# Patient Record
Sex: FEMALE | Race: WHITE | NOT HISPANIC OR LATINO | Employment: OTHER | URBAN - METROPOLITAN AREA
[De-identification: names, ages, dates, MRNs, and addresses within clinical notes are randomized per-mention and may not be internally consistent; named-entity substitution may affect disease eponyms.]

---

## 2023-07-19 ENCOUNTER — OFFICE VISIT (OUTPATIENT)
Dept: PRIMARY CARE | Facility: CLINIC | Age: 78
End: 2023-07-19
Payer: MEDICARE

## 2023-07-19 ENCOUNTER — LAB (OUTPATIENT)
Dept: LAB | Facility: LAB | Age: 78
End: 2023-07-19
Payer: MEDICARE

## 2023-07-19 VITALS — SYSTOLIC BLOOD PRESSURE: 132 MMHG | BODY MASS INDEX: 28.63 KG/M2 | WEIGHT: 151.5 LBS | DIASTOLIC BLOOD PRESSURE: 75 MMHG

## 2023-07-19 DIAGNOSIS — I10 PRIMARY HYPERTENSION: ICD-10-CM

## 2023-07-19 DIAGNOSIS — M48.061 SPINAL STENOSIS OF LUMBAR REGION, UNSPECIFIED WHETHER NEUROGENIC CLAUDICATION PRESENT: ICD-10-CM

## 2023-07-19 DIAGNOSIS — E11.9 TYPE 2 DIABETES MELLITUS WITHOUT COMPLICATION, UNSPECIFIED WHETHER LONG TERM INSULIN USE (MULTI): ICD-10-CM

## 2023-07-19 DIAGNOSIS — E78.2 MIXED HYPERLIPIDEMIA: ICD-10-CM

## 2023-07-19 DIAGNOSIS — Z00.00 HEALTH CARE MAINTENANCE: ICD-10-CM

## 2023-07-19 DIAGNOSIS — E11.9 TYPE 2 DIABETES MELLITUS WITHOUT COMPLICATION, UNSPECIFIED WHETHER LONG TERM INSULIN USE (MULTI): Primary | ICD-10-CM

## 2023-07-19 LAB
ALANINE AMINOTRANSFERASE (SGPT) (U/L) IN SER/PLAS: 39 U/L (ref 7–45)
ALBUMIN (G/DL) IN SER/PLAS: 4.7 G/DL (ref 3.4–5)
ALKALINE PHOSPHATASE (U/L) IN SER/PLAS: 101 U/L (ref 33–136)
ANION GAP IN SER/PLAS: 14 MMOL/L (ref 10–20)
ASPARTATE AMINOTRANSFERASE (SGOT) (U/L) IN SER/PLAS: 31 U/L (ref 9–39)
BILIRUBIN TOTAL (MG/DL) IN SER/PLAS: 0.3 MG/DL (ref 0–1.2)
CALCIUM (MG/DL) IN SER/PLAS: 10.1 MG/DL (ref 8.6–10.6)
CARBON DIOXIDE, TOTAL (MMOL/L) IN SER/PLAS: 29 MMOL/L (ref 21–32)
CHLORIDE (MMOL/L) IN SER/PLAS: 103 MMOL/L (ref 98–107)
CHOLESTEROL (MG/DL) IN SER/PLAS: 165 MG/DL (ref 0–199)
CHOLESTEROL IN HDL (MG/DL) IN SER/PLAS: 40.8 MG/DL
CHOLESTEROL/HDL RATIO: 4
CREATININE (MG/DL) IN SER/PLAS: 1.04 MG/DL (ref 0.5–1.05)
ERYTHROCYTE DISTRIBUTION WIDTH (RATIO) BY AUTOMATED COUNT: 13.8 % (ref 11.5–14.5)
ERYTHROCYTE MEAN CORPUSCULAR HEMOGLOBIN CONCENTRATION (G/DL) BY AUTOMATED: 32.5 G/DL (ref 32–36)
ERYTHROCYTE MEAN CORPUSCULAR VOLUME (FL) BY AUTOMATED COUNT: 92 FL (ref 80–100)
ERYTHROCYTES (10*6/UL) IN BLOOD BY AUTOMATED COUNT: 4.52 X10E12/L (ref 4–5.2)
ESTIMATED AVERAGE GLUCOSE FOR HBA1C: 146 MG/DL
GFR FEMALE: 56 ML/MIN/1.73M2
GLUCOSE (MG/DL) IN SER/PLAS: 101 MG/DL (ref 74–99)
HEMATOCRIT (%) IN BLOOD BY AUTOMATED COUNT: 41.8 % (ref 36–46)
HEMOGLOBIN (G/DL) IN BLOOD: 13.6 G/DL (ref 12–16)
HEMOGLOBIN A1C/HEMOGLOBIN TOTAL IN BLOOD: 6.7 %
LDL: 76 MG/DL (ref 0–99)
LEUKOCYTES (10*3/UL) IN BLOOD BY AUTOMATED COUNT: 5.4 X10E9/L (ref 4.4–11.3)
NON HDL CHOLESTEROL: 124 MG/DL
NRBC (PER 100 WBCS) BY AUTOMATED COUNT: 0 /100 WBC (ref 0–0)
PLATELETS (10*3/UL) IN BLOOD AUTOMATED COUNT: 313 X10E9/L (ref 150–450)
POTASSIUM (MMOL/L) IN SER/PLAS: 5 MMOL/L (ref 3.5–5.3)
PROTEIN TOTAL: 7.1 G/DL (ref 6.4–8.2)
SODIUM (MMOL/L) IN SER/PLAS: 141 MMOL/L (ref 136–145)
TRIGLYCERIDE (MG/DL) IN SER/PLAS: 240 MG/DL (ref 0–149)
UREA NITROGEN (MG/DL) IN SER/PLAS: 13 MG/DL (ref 6–23)
VLDL: 48 MG/DL (ref 0–40)

## 2023-07-19 PROCEDURE — 3075F SYST BP GE 130 - 139MM HG: CPT | Performed by: INTERNAL MEDICINE

## 2023-07-19 PROCEDURE — 85027 COMPLETE CBC AUTOMATED: CPT

## 2023-07-19 PROCEDURE — 80061 LIPID PANEL: CPT

## 2023-07-19 PROCEDURE — 1126F AMNT PAIN NOTED NONE PRSNT: CPT | Performed by: INTERNAL MEDICINE

## 2023-07-19 PROCEDURE — G0439 PPPS, SUBSEQ VISIT: HCPCS | Performed by: INTERNAL MEDICINE

## 2023-07-19 PROCEDURE — 1036F TOBACCO NON-USER: CPT | Performed by: INTERNAL MEDICINE

## 2023-07-19 PROCEDURE — 99214 OFFICE O/P EST MOD 30 MIN: CPT | Performed by: INTERNAL MEDICINE

## 2023-07-19 PROCEDURE — 93000 ELECTROCARDIOGRAM COMPLETE: CPT | Performed by: INTERNAL MEDICINE

## 2023-07-19 PROCEDURE — 1170F FXNL STATUS ASSESSED: CPT | Performed by: INTERNAL MEDICINE

## 2023-07-19 PROCEDURE — 1160F RVW MEDS BY RX/DR IN RCRD: CPT | Performed by: INTERNAL MEDICINE

## 2023-07-19 PROCEDURE — 36415 COLL VENOUS BLD VENIPUNCTURE: CPT

## 2023-07-19 PROCEDURE — 3044F HG A1C LEVEL LT 7.0%: CPT | Performed by: INTERNAL MEDICINE

## 2023-07-19 PROCEDURE — 80053 COMPREHEN METABOLIC PANEL: CPT

## 2023-07-19 PROCEDURE — 3078F DIAST BP <80 MM HG: CPT | Performed by: INTERNAL MEDICINE

## 2023-07-19 PROCEDURE — 1159F MED LIST DOCD IN RCRD: CPT | Performed by: INTERNAL MEDICINE

## 2023-07-19 PROCEDURE — 83036 HEMOGLOBIN GLYCOSYLATED A1C: CPT

## 2023-07-19 RX ORDER — GABAPENTIN 300 MG/1
300 CAPSULE ORAL 2 TIMES DAILY
COMMUNITY
End: 2023-08-04 | Stop reason: SDUPTHER

## 2023-07-19 RX ORDER — GABAPENTIN 300 MG/1
300 CAPSULE ORAL 2 TIMES DAILY
Qty: 180 CAPSULE | Refills: 0 | Status: CANCELLED | OUTPATIENT
Start: 2023-07-19

## 2023-07-19 NOTE — PROGRESS NOTES
Subjective   Patient ID: Alida Drew is a 77 y.o. female who presents for No chief complaint on file..    HPI CPE see updated front sheet no chest pain no shortness of breath no polyuria polydipsia risk-benefit side effect of gabapentin medicine reviewed with her and wishes to proceed joints have been aching right sacroiliac area right hand not inconsistent with carpal tunnel distribution her cervical spine has been okay her lumbar spine is actually been okay the gabapentin has been helpful especially at night for legs and back she also takes an Advil PM bowels okay no dysuria no polyuria polydipsia    Past medical history spinal stenosis    Medications noted and unchanged    Allergies no known drug allergies    Social history no tobacco    Family history Brother colon cancer    Prevention had colonoscopy in the past with Dr. Balbuena had hand surgery in the past with Dr. Flores some exercise some prior blood work reviewed    Depression screen not depressed    Review of Systems    Objective   There were no vitals taken for this visit.    Physical Exam vital signs noted alert and oriented x3 NCAT PERRLA EOMI nares without discharge OP benign TM normal bilateral EAC clear bilateral no AC nodes no JVD or bruit no thyromegaly chest clear to auscultation and percussion CV regular rate and rhythm S1-S2 without murmur gallop or rub abdomen soft nontender normal active bowel sounds no rebound or guarding no HSM LS spine normal curvature negative straight leg raise negative logroll negative SI joint tenderness cervical spine limited range of motion normal except for extremity handgrips some tingling in the distribution of the right carpal tunnel extremities no clubbing cyanosis or edema normal distal pulses DTR 2+ upper extremity 0-1+ lower extremity             impression General medical examination spinal stenosis diabetes mellitus?  Hypertension?  Hyperlipidemia?  Right carpal tunnel right sacroiliac  cough    Assessment/Plan    plan has seen ophthalmologist check EKG advised on heart consider ARB or other renal medicine currently  She has had some slight cough for the past 4 weeks lungs are clear we will use lozenges for presumed vocal cord strain or tracheitis does not apparently have much in the way of GERD type symptoms but may change her Advil PM to Tylenol PM and avoid NSAIDs for the stomach and kidneys check glycohemoglobin advised on long-term blood sugar test check Chem-7 advised on glucose potassium and kidney function advised on blood pressure blood pressure medicine check hepatic panel lipid panel advised on cholesterol cholesterol medicine check CBC advised on blood count follow-up on next colonoscopy she will go to the orthopedic hand surgeon at her convenience for the carpal tunnel follow-up for next mammogram [place] Tylenol or topical agents as needed for the sacroiliac back hygiene back stretches continue with the gabapentin as needed (see below) and follow-up 3 months based on above TT 60 cc 31     Review medications (has not filled gabapentin lately) check

## 2023-07-30 ASSESSMENT — ENCOUNTER SYMPTOMS
LOSS OF SENSATION IN FEET: 0
OCCASIONAL FEELINGS OF UNSTEADINESS: 0
DEPRESSION: 0

## 2023-07-30 ASSESSMENT — PATIENT HEALTH QUESTIONNAIRE - PHQ9
1. LITTLE INTEREST OR PLEASURE IN DOING THINGS: NOT AT ALL
SUM OF ALL RESPONSES TO PHQ9 QUESTIONS 1 AND 2: 0
2. FEELING DOWN, DEPRESSED OR HOPELESS: NOT AT ALL

## 2023-07-30 ASSESSMENT — ACTIVITIES OF DAILY LIVING (ADL)
BATHING: INDEPENDENT
MANAGING_FINANCES: INDEPENDENT
GROCERY_SHOPPING: INDEPENDENT
DRESSING: INDEPENDENT
TAKING_MEDICATION: INDEPENDENT
DOING_HOUSEWORK: INDEPENDENT

## 2023-08-04 DIAGNOSIS — M48.061 SPINAL STENOSIS OF LUMBAR REGION, UNSPECIFIED WHETHER NEUROGENIC CLAUDICATION PRESENT: Primary | ICD-10-CM

## 2023-08-04 RX ORDER — GABAPENTIN 300 MG/1
300 CAPSULE ORAL 2 TIMES DAILY
Qty: 60 CAPSULE | Refills: 0 | Status: SHIPPED | OUTPATIENT
Start: 2023-08-04 | End: 2023-10-07 | Stop reason: SDUPTHER

## 2023-09-25 PROBLEM — M79.643 HAND PAIN: Status: ACTIVE | Noted: 2023-09-25

## 2023-09-25 PROBLEM — R53.83 FATIGUE: Status: ACTIVE | Noted: 2023-09-25

## 2023-09-25 PROBLEM — J20.8 ACUTE BACTERIAL BRONCHITIS: Status: ACTIVE | Noted: 2023-09-25

## 2023-09-25 PROBLEM — E11.9 DIABETES (MULTI): Status: ACTIVE | Noted: 2023-09-25

## 2023-09-25 PROBLEM — B96.89 ACUTE BACTERIAL BRONCHITIS: Status: ACTIVE | Noted: 2023-09-25

## 2023-09-25 PROBLEM — K21.9 GERD (GASTROESOPHAGEAL REFLUX DISEASE): Status: ACTIVE | Noted: 2023-09-25

## 2023-09-25 PROBLEM — L30.9 DERMATITIS: Status: ACTIVE | Noted: 2023-09-25

## 2023-09-25 PROBLEM — M65.30 TRIGGER FINGER: Status: ACTIVE | Noted: 2023-09-25

## 2023-09-25 PROBLEM — E78.5 HYPERLIPIDEMIA: Status: ACTIVE | Noted: 2023-09-25

## 2023-09-25 PROBLEM — F32.A DEPRESSION: Status: ACTIVE | Noted: 2023-09-25

## 2023-09-25 PROBLEM — R13.10 DYSPHAGIA: Status: ACTIVE | Noted: 2023-09-25

## 2023-09-25 PROBLEM — J32.0 MAXILLARY SINUSITIS: Status: ACTIVE | Noted: 2023-09-25

## 2023-09-25 PROBLEM — G47.00 ACUTE INSOMNIA: Status: ACTIVE | Noted: 2023-09-25

## 2023-09-25 RX ORDER — ELECTROLYTES/DEXTROSE
SOLUTION, ORAL ORAL
COMMUNITY

## 2023-09-25 RX ORDER — OMEGA-3 FATTY ACIDS/FISH OIL 340-1000MG
CAPSULE ORAL 2 TIMES DAILY
COMMUNITY

## 2023-09-25 RX ORDER — MULTIVIT-MIN/IRON/FOLIC ACID/K 18-600-40
CAPSULE ORAL
COMMUNITY

## 2023-09-25 RX ORDER — FLUOCINONIDE CREAM (EMULSIFIED BASE) 0.5 MG/G
CREAM TOPICAL
Status: ON HOLD | COMMUNITY
End: 2023-11-27 | Stop reason: ALTCHOICE

## 2023-09-25 RX ORDER — FLUOCINONIDE 0.5 MG/G
CREAM TOPICAL 2 TIMES DAILY
COMMUNITY
Start: 2023-03-27

## 2023-09-25 RX ORDER — MELATON/THEAN/VAL/LEM/CHAM/LAV 10MG-200MG
1 TABLET,IMMED, EXTENDED RELEASE, BIPHASIC ORAL DAILY
COMMUNITY

## 2023-09-25 RX ORDER — TRAMADOL HYDROCHLORIDE 50 MG/1
TABLET ORAL EVERY 6 HOURS PRN
Status: ON HOLD | COMMUNITY
Start: 2022-04-25 | End: 2023-11-27 | Stop reason: ALTCHOICE

## 2023-09-25 RX ORDER — AMOXICILLIN AND CLAVULANATE POTASSIUM 875; 125 MG/1; MG/1
1 TABLET, FILM COATED ORAL EVERY 12 HOURS
Status: ON HOLD | COMMUNITY
Start: 2023-02-28 | End: 2023-11-27 | Stop reason: ALTCHOICE

## 2023-09-25 RX ORDER — COVID-19 ANTIGEN TEST
KIT MISCELLANEOUS
Status: ON HOLD | COMMUNITY
Start: 2023-03-29 | End: 2023-11-27 | Stop reason: ALTCHOICE

## 2023-09-25 RX ORDER — FLAXSEED OIL 1000 MG
CAPSULE ORAL
COMMUNITY

## 2023-09-25 RX ORDER — GLIMEPIRIDE 2 MG/1
TABLET ORAL
COMMUNITY
End: 2024-01-23

## 2023-09-25 RX ORDER — PHENYLEPHRINE HCL 10 MG
TABLET ORAL 2 TIMES DAILY
COMMUNITY

## 2023-09-25 RX ORDER — GLUCOSAMINE/CHONDR SU A SOD 750-600 MG
TABLET ORAL 2 TIMES DAILY
COMMUNITY

## 2023-09-25 RX ORDER — NAPROXEN SODIUM 220 MG
TABLET ORAL EVERY 12 HOURS PRN
Status: ON HOLD | COMMUNITY
End: 2023-11-27 | Stop reason: ALTCHOICE

## 2023-09-25 RX ORDER — OXYCODONE AND ACETAMINOPHEN 5; 325 MG/1; MG/1
TABLET ORAL
Status: ON HOLD | COMMUNITY
Start: 2022-04-25 | End: 2023-11-27 | Stop reason: ALTCHOICE

## 2023-09-25 RX ORDER — AMOXICILLIN 500 MG/1
CAPSULE ORAL
Status: ON HOLD | COMMUNITY
Start: 2023-02-20 | End: 2023-11-27 | Stop reason: ALTCHOICE

## 2023-09-25 RX ORDER — ROSUVASTATIN CALCIUM 10 MG/1
TABLET, COATED ORAL
COMMUNITY
End: 2023-10-24

## 2023-09-25 RX ORDER — CHOLECALCIFEROL (VITAMIN D3) 25 MCG
1 TABLET ORAL DAILY
COMMUNITY

## 2023-09-25 RX ORDER — RABEPRAZOLE SODIUM 20 MG/1
20 TABLET, DELAYED RELEASE ORAL DAILY
COMMUNITY
End: 2023-10-24

## 2023-09-25 RX ORDER — ESCITALOPRAM OXALATE 10 MG/1
1 TABLET ORAL DAILY
COMMUNITY
End: 2024-01-23

## 2023-10-03 ENCOUNTER — PROCEDURE VISIT (OUTPATIENT)
Dept: NEUROLOGY | Facility: CLINIC | Age: 78
End: 2023-10-03
Payer: MEDICARE

## 2023-10-03 DIAGNOSIS — G56.01 CARPAL TUNNEL SYNDROME OF RIGHT WRIST: Primary | ICD-10-CM

## 2023-10-03 DIAGNOSIS — M48.061 SPINAL STENOSIS OF LUMBAR REGION, UNSPECIFIED WHETHER NEUROGENIC CLAUDICATION PRESENT: ICD-10-CM

## 2023-10-03 PROCEDURE — 76883 US NRV&ACC STRUX 1XTR COMPRE: CPT | Performed by: STUDENT IN AN ORGANIZED HEALTH CARE EDUCATION/TRAINING PROGRAM

## 2023-10-03 NOTE — PROGRESS NOTES
Performed by: Roshan Velasco DO  Authorized by: Roshan Velasco DO      Comments: NEUROMUSCULAR ULTRASOUND OF THE RIGHT [WRIST AND FOREARM]    HEIGHT: 5ft 1 in  WEIGHT: 150 lbs.  HANDEDNESS: RIGHT    INDICATION FOR NEUROMUSCULAR ULTRASOUND:    (a) to evaluate the echotexture and size of the right median nerve at the carpal tunnel; (b) confirm the right median nerve was normal in the forearm; (c) to assess for any identifiable structural source of right median nerve compression; and (d) to assess adjacent structures around the right wrist for abnormalities.    TECHNIQUE:    Neuromuscular ultrasound of the right wrist and forearm was performed using a tsumobi P9 ultrasound machine with a 15-4 MHz linear transducer. The right median nerve was examined at the distal wrist crease and in the mid-forearm, both in transverse and longitudinal views. The patient was examined supine with the arm extended and supinated. Cross sectional area (CSA) was measured within the epineurium, using the trace method. At locations where repeat measurements were taken, the mean value is reported below. Transverse and longitudinal images were obtained.    FINDINGS:  At the right wrist at the distal wrist crease (proximal carpal tunnel), the median nerve CSA was 17.6 mm2 (NL < 10 mm2; borderline 10-13 mm2; ABN > 13 mm2).     The flattening ratio at this location was 5.5 (ABN >3).    The echogenicity of the right median nerve was moderately reduced. The mobility of the right median nerve with flexion and extension of the fingers was slightly restricted. Color Doppler of the right median nerve showed normal median nerve vascularity. No abnormal tenosynovitis of the right flexor tendons was noted.    Using a longitudinal scan of the right median nerve at the wrist, a notch sign was seen under the flexor retinaculum.    A right persistent median artery was not present. A right bifid median nerve was not  present. No other abnormal  mass or ganglia was appreciated in the right carpal tunnel. With extension of the fingers, there was mild intrusion of the right flexor digitorum sublimis. With flexion of the fingers, there was no abnormal intrusion of the right lumbrical muscles.    In the mid-forearm, approximately 12 cm proximal to the distal wrist crease, the right median nerve was seen between the flexor digitorum sublimis and flexor digitorum profundus muscles. At the mid-forearm, the right median nerve CSA was 5.9 mm2.  The ratio of the right median CSAs between the wrist and forearm (WFR) was 3.0 (NL < 1.5; borderline: 1.5 - 2.0).  The echogenicity of the right median nerve in the forearm was normal.    Scanning the muscles, the echogenicity of the abductor pollicis brevis and flexor pollicis brevis (superficial) were slightly increased along with mildly decreased muscle thickness. The echogenicity was normal of the abductor pollicis brevis, flexor digitorum sublimis, flexor digitorum profundus, flexor carpi radialis, and pronator teres.    At the wrist joint, the radial carpal joint was normal. No abnormal effusion was seen. The flexor carpi radialis tendon was revealed a mild amount of paratendinous fluid without increased signal on color Doppler. Both the radial and ulnar arteries were well seen and were normal.    IMPRESSION:   This is an abnormal neuromuscular ultrasound examination of the right wrist and volar forearm.  There was ultrasound evidence of a moderate median neuropathy at the right wrist. In addition, there was a mild degree of tendinosis in the distal right flexor carpi radialis insertion. The other visualized osseous, ligamentous and tendinous structures appear normal.         Roshan Velasco DO

## 2023-10-07 RX ORDER — GABAPENTIN 300 MG/1
300 CAPSULE ORAL 2 TIMES DAILY
Qty: 60 CAPSULE | Refills: 0 | Status: SHIPPED | OUTPATIENT
Start: 2023-10-07 | End: 2024-01-26

## 2023-10-07 RX ORDER — GABAPENTIN 300 MG/1
300 CAPSULE ORAL 3 TIMES DAILY
Qty: 90 CAPSULE | Refills: 0 | OUTPATIENT
Start: 2023-10-07

## 2023-10-09 ENCOUNTER — TELEPHONE (OUTPATIENT)
Dept: NEUROLOGY | Facility: HOSPITAL | Age: 78
End: 2023-10-09

## 2023-10-09 ENCOUNTER — OFFICE VISIT (OUTPATIENT)
Dept: PRIMARY CARE | Facility: CLINIC | Age: 78
End: 2023-10-09
Payer: MEDICARE

## 2023-10-09 VITALS — SYSTOLIC BLOOD PRESSURE: 126 MMHG | DIASTOLIC BLOOD PRESSURE: 76 MMHG

## 2023-10-09 DIAGNOSIS — I10 PRIMARY HYPERTENSION: ICD-10-CM

## 2023-10-09 DIAGNOSIS — E11.9 TYPE 2 DIABETES MELLITUS WITHOUT COMPLICATION, UNSPECIFIED WHETHER LONG TERM INSULIN USE (MULTI): Primary | ICD-10-CM

## 2023-10-09 DIAGNOSIS — M48.061 SPINAL STENOSIS OF LUMBAR REGION, UNSPECIFIED WHETHER NEUROGENIC CLAUDICATION PRESENT: ICD-10-CM

## 2023-10-09 PROCEDURE — 1160F RVW MEDS BY RX/DR IN RCRD: CPT | Performed by: INTERNAL MEDICINE

## 2023-10-09 PROCEDURE — 1159F MED LIST DOCD IN RCRD: CPT | Performed by: INTERNAL MEDICINE

## 2023-10-09 PROCEDURE — 99213 OFFICE O/P EST LOW 20 MIN: CPT | Performed by: INTERNAL MEDICINE

## 2023-10-09 PROCEDURE — 3074F SYST BP LT 130 MM HG: CPT | Performed by: INTERNAL MEDICINE

## 2023-10-09 PROCEDURE — 1036F TOBACCO NON-USER: CPT | Performed by: INTERNAL MEDICINE

## 2023-10-09 PROCEDURE — 1126F AMNT PAIN NOTED NONE PRSNT: CPT | Performed by: INTERNAL MEDICINE

## 2023-10-09 PROCEDURE — 3078F DIAST BP <80 MM HG: CPT | Performed by: INTERNAL MEDICINE

## 2023-10-09 NOTE — TELEPHONE ENCOUNTER
Pt called and stated that she is aware of the difficulty with the new system.  Pt trying to schedule an appt with Dr. Thomas but unable to read the US results.

## 2023-10-09 NOTE — PROGRESS NOTES
Subjective   Patient ID: Alida Drew is a 78 y.o. female who presents for No chief complaint on file..    HPI follow-up visit no chest pain no shortness of breath had neuromuscular ultrasound and will have her carpal tunnel syndrome surgery repeated upcoming she has appointment with orthopedic hand surgeon no polyuria polydipsia no hypoglycemic events gabapentin has been helpful for her low back occasionally she will take a second capsule and this has been helpful no side effect with medication prior laboratory results reviewed    Review of Systems    Objective   There were no vitals taken for this visit.    Physical Exam    Assessment/Plan vital signs noted alert and oriented x3 NCAT no JVD or bruit chest clear to auscultation and percussion CV regular rate and rhythm S1-S2 without murmur gallop or rub extremities no clubbing cyanosis or edema normal distal pulses no coryza nares without discharge OP benign musculoskeletal bilateral hand DJD changes    Impression hypertension?  Hyperlipidemia?  Diabetes mellitus?  DJD hands and low back pain  Plan refill gabapentin when needed just refilled recently good diet regular exercise increase water consumption follow-up with orthopedic surgery no additional blood work was needed yet today but may repeat at next visit in 6 months continue with current treatment Tylenol as needed liniment cream for the hands and follow-up 2024 spring

## 2023-10-24 DIAGNOSIS — R13.10 DYSPHAGIA: ICD-10-CM

## 2023-10-24 RX ORDER — ROSUVASTATIN CALCIUM 10 MG/1
TABLET, COATED ORAL
Qty: 90 TABLET | Refills: 1 | Status: SHIPPED | OUTPATIENT
Start: 2023-10-24 | End: 2024-04-19

## 2023-10-24 RX ORDER — RABEPRAZOLE SODIUM 20 MG/1
20 TABLET, DELAYED RELEASE ORAL DAILY
Qty: 90 TABLET | Refills: 1 | Status: SHIPPED | OUTPATIENT
Start: 2023-10-24 | End: 2024-04-19

## 2023-11-08 ENCOUNTER — OFFICE VISIT (OUTPATIENT)
Dept: ORTHOPEDIC SURGERY | Facility: CLINIC | Age: 78
End: 2023-11-08
Payer: MEDICARE

## 2023-11-08 DIAGNOSIS — G56.01 CARPAL TUNNEL SYNDROME OF RIGHT WRIST: Primary | ICD-10-CM

## 2023-11-08 PROCEDURE — 99214 OFFICE O/P EST MOD 30 MIN: CPT | Performed by: ORTHOPAEDIC SURGERY

## 2023-11-08 PROCEDURE — 1126F AMNT PAIN NOTED NONE PRSNT: CPT | Performed by: ORTHOPAEDIC SURGERY

## 2023-11-08 PROCEDURE — 1036F TOBACCO NON-USER: CPT | Performed by: ORTHOPAEDIC SURGERY

## 2023-11-08 PROCEDURE — 1159F MED LIST DOCD IN RCRD: CPT | Performed by: ORTHOPAEDIC SURGERY

## 2023-11-08 PROCEDURE — 1160F RVW MEDS BY RX/DR IN RCRD: CPT | Performed by: ORTHOPAEDIC SURGERY

## 2023-11-08 NOTE — LETTER
November 10, 2023     Frederick Atwood MD  [address]  [facility] [address]  [facility] [address]    Patient: Alida Drew   YOB: 1945   Date of Visit: 11/8/2023       Dear Dr. Frederick Atwood MD:    Thank you for referring Alida Drew to me for evaluation. Below are my notes for this consultation.  If you have questions, please do not hesitate to call me. I look forward to following your patient along with you.       Sincerely,     Alessandro Thomas MD      CC: No Recipients  ______________________________________________________________________________________    CHIEF COMPLAINT         Follow-up after NMUS    ASSESSMENT + PLAN    Recurrent carpal tunnel syndrome with positive NMUS after remote carpal tunnel surgery    We had a long discussion about potential causes for these recurrent symptoms.  The NMUS does show enlargement of the nerve, a positive notch sign, and limited glide, all pointing to recurrent compression at the carpal tunnel.  We discussed the major risks and benefits of revision carpal tunnel surgery and the possible need for placement of a scar barrier.  Surgery would be under sedation and local at the location of your convenience.  Contact my office to set up an exact surgical date.    Follow-up in the meantime with any interval concerns.        HISTORY OF PRESENT ILLNESS       Patient returns today, 2 months after last visit, having completed NMUS to evaluate her right carpal tunnel at the site of previous surgical decompression by Dr. Boogie Conde in 1998.  No interval trauma.  No significant change in her symptoms over the last couple of months.  She is still wearing the night splint but is having breakthrough symptoms.      PHYSICAL EXAM       Essentially unchanged.  Well-developed, well-nourished female in no acute distress.  She appears her stated age and has a pleasant affect.  Incision at the carpal tunnel is well-healed and nontender.  No sign of  infection.  Full composite finger flexion extension with good intrinsic plus minus posture.  No significant triggering.  5/5 APB and hand intrinsics.  Positive Phalen and Durkan but negative Tinel at the wrist.  Negative elbow flexion test.  Cervical range of motion remains good and does not reproduce symptoms.      IMAGING / LABS / EMGs        Neuromuscular ultrasound of the right upper extremity from October 3 was reviewed.  There is increased CSA at 17.6 with significant flattening ratio of 5.5.  Decreased echogenicity.  Slightly decreased glide.  There is a positive notch sign.  There is mild FDS intrusion.  Size ratio to the forearm is 3.0.  Conclusion is recurrent median nerve compression at the wrist (carpal tunnel syndrome,) and I agree.    POSTING    I reviewed the options for further management of this condition and the likely success rates of each.  The patient feels that they have maximized the benefits of conservative care, and they do want to go on to surgery.    I reviewed the major risks of surgery including infection; scarring; damage to nerves, tendons, or vessels; stiffness; failure to relieve symptoms; recurrent symptoms; pillar pain and wound healing problems, as well as anesthesia risks.  I answered their questions to their satisfaction.  They were given my contact information and will contact the office when they are ready to schedule an exact surgical date.  Surgery will be posted as follows :    Dx :         Recurrent right carpal tunnel syndrome  ICD-10 :    G56.01  Procedure : Revision right carpal tunnel release with possible scar barrier placement  CPT :      79781  Anesth : MAC  [facility]  Duration : 60 minutes  Specials : Oswald 10 x 48 scar barrier  PAT :       No  Post-Op Visit :   10-15 days      Electronically Signed      YNES Thomas MD      Orthopaedic Hand Surgery      [phone]

## 2023-11-10 DIAGNOSIS — G56.01 CARPAL TUNNEL SYNDROME, RIGHT: ICD-10-CM

## 2023-11-10 NOTE — H&P (VIEW-ONLY)
CHIEF COMPLAINT         Follow-up after NMUS    ASSESSMENT + PLAN    Recurrent carpal tunnel syndrome with positive NMUS after remote carpal tunnel surgery    We had a long discussion about potential causes for these recurrent symptoms.  The NMUS does show enlargement of the nerve, a positive notch sign, and limited glide, all pointing to recurrent compression at the carpal tunnel.  We discussed the major risks and benefits of revision carpal tunnel surgery and the possible need for placement of a scar barrier.  Surgery would be under sedation and local at the location of your convenience.  Contact my office to set up an exact surgical date.    Follow-up in the meantime with any interval concerns.        HISTORY OF PRESENT ILLNESS       Patient returns today, 2 months after last visit, having completed NMUS to evaluate her right carpal tunnel at the site of previous surgical decompression by Dr. Boogie Conde in 1998.  No interval trauma.  No significant change in her symptoms over the last couple of months.  She is still wearing the night splint but is having breakthrough symptoms.      PHYSICAL EXAM       Essentially unchanged.  Well-developed, well-nourished female in no acute distress.  She appears her stated age and has a pleasant affect.  Incision at the carpal tunnel is well-healed and nontender.  No sign of infection.  Full composite finger flexion extension with good intrinsic plus minus posture.  No significant triggering.  5/5 APB and hand intrinsics.  Positive Phalen and Durkan but negative Tinel at the wrist.  Negative elbow flexion test.  Cervical range of motion remains good and does not reproduce symptoms.      IMAGING / LABS / EMGs        Neuromuscular ultrasound of the right upper extremity from October 3 was reviewed.  There is increased CSA at 17.6 with significant flattening ratio of 5.5.  Decreased echogenicity.  Slightly decreased glide.  There is a positive notch sign.  There is mild FDS  intrusion.  Size ratio to the forearm is 3.0.  Conclusion is recurrent median nerve compression at the wrist (carpal tunnel syndrome,) and I agree.    POSTING    I reviewed the options for further management of this condition and the likely success rates of each.  The patient feels that they have maximized the benefits of conservative care, and they do want to go on to surgery.    I reviewed the major risks of surgery including infection; scarring; damage to nerves, tendons, or vessels; stiffness; failure to relieve symptoms; recurrent symptoms; pillar pain and wound healing problems, as well as anesthesia risks.  I answered their questions to their satisfaction.  They were given my contact information and will contact the office when they are ready to schedule an exact surgical date.  Surgery will be posted as follows :    Dx :         Recurrent right carpal tunnel syndrome  ICD-10 :    G56.01  Procedure : Revision right carpal tunnel release with possible scar barrier placement  CPT :      35177  Anesth : MAC  [facility]  Duration : 60 minutes  Specials : Oswald 10 x 48 scar barrier  PAT :       No  Post-Op Visit :   10-15 days      Electronically Signed      YNES Thomas MD      Orthopaedic Hand Surgery      [phone]

## 2023-11-10 NOTE — PROGRESS NOTES
CHIEF COMPLAINT         Follow-up after NMUS    ASSESSMENT + PLAN    Recurrent carpal tunnel syndrome with positive NMUS after remote carpal tunnel surgery    We had a long discussion about potential causes for these recurrent symptoms.  The NMUS does show enlargement of the nerve, a positive notch sign, and limited glide, all pointing to recurrent compression at the carpal tunnel.  We discussed the major risks and benefits of revision carpal tunnel surgery and the possible need for placement of a scar barrier.  Surgery would be under sedation and local at the location of your convenience.  Contact my office to set up an exact surgical date.    Follow-up in the meantime with any interval concerns.        HISTORY OF PRESENT ILLNESS       Patient returns today, 2 months after last visit, having completed NMUS to evaluate her right carpal tunnel at the site of previous surgical decompression by Dr. Boogie Conde in 1998.  No interval trauma.  No significant change in her symptoms over the last couple of months.  She is still wearing the night splint but is having breakthrough symptoms.      PHYSICAL EXAM       Essentially unchanged.  Well-developed, well-nourished female in no acute distress.  She appears her stated age and has a pleasant affect.  Incision at the carpal tunnel is well-healed and nontender.  No sign of infection.  Full composite finger flexion extension with good intrinsic plus minus posture.  No significant triggering.  5/5 APB and hand intrinsics.  Positive Phalen and Durkan but negative Tinel at the wrist.  Negative elbow flexion test.  Cervical range of motion remains good and does not reproduce symptoms.      IMAGING / LABS / EMGs        Neuromuscular ultrasound of the right upper extremity from October 3 was reviewed.  There is increased CSA at 17.6 with significant flattening ratio of 5.5.  Decreased echogenicity.  Slightly decreased glide.  There is a positive notch sign.  There is mild FDS  intrusion.  Size ratio to the forearm is 3.0.  Conclusion is recurrent median nerve compression at the wrist (carpal tunnel syndrome,) and I agree.    POSTING    I reviewed the options for further management of this condition and the likely success rates of each.  The patient feels that they have maximized the benefits of conservative care, and they do want to go on to surgery.    I reviewed the major risks of surgery including infection; scarring; damage to nerves, tendons, or vessels; stiffness; failure to relieve symptoms; recurrent symptoms; pillar pain and wound healing problems, as well as anesthesia risks.  I answered their questions to their satisfaction.  They were given my contact information and will contact the office when they are ready to schedule an exact surgical date.  Surgery will be posted as follows :    Dx :         Recurrent right carpal tunnel syndrome  ICD-10 :    G56.01  Procedure : Revision right carpal tunnel release with possible scar barrier placement  CPT :      72302  Anesth : MAC  [facility] : [facility]  Duration : 60 minutes  Specials : Oswald 10 x 48 scar barrier  PAT :       No  Post-Op Visit :   10-15 days      Electronically Signed      YNES Thomas MD      Orthopaedic Hand Surgery      [phone]

## 2023-11-21 PROBLEM — G56.01 CARPAL TUNNEL SYNDROME, RIGHT: Status: ACTIVE | Noted: 2023-11-10

## 2023-11-27 ENCOUNTER — ANESTHESIA (OUTPATIENT)
Dept: OPERATING ROOM | Facility: CLINIC | Age: 78
End: 2023-11-27
Payer: MEDICARE

## 2023-11-27 ENCOUNTER — ANESTHESIA EVENT (OUTPATIENT)
Dept: OPERATING ROOM | Facility: CLINIC | Age: 78
End: 2023-11-27
Payer: MEDICARE

## 2023-11-27 ENCOUNTER — HOSPITAL ENCOUNTER (OUTPATIENT)
Facility: CLINIC | Age: 78
Setting detail: OUTPATIENT SURGERY
Discharge: HOME | End: 2023-11-27
Attending: ORTHOPAEDIC SURGERY | Admitting: ORTHOPAEDIC SURGERY
Payer: MEDICARE

## 2023-11-27 VITALS
WEIGHT: 154.54 LBS | BODY MASS INDEX: 29.18 KG/M2 | RESPIRATION RATE: 17 BRPM | HEIGHT: 61 IN | HEART RATE: 68 BPM | SYSTOLIC BLOOD PRESSURE: 148 MMHG | TEMPERATURE: 97.9 F | DIASTOLIC BLOOD PRESSURE: 68 MMHG | OXYGEN SATURATION: 96 %

## 2023-11-27 DIAGNOSIS — G89.18 ACUTE POST-OPERATIVE PAIN: Primary | ICD-10-CM

## 2023-11-27 DIAGNOSIS — G56.01 CARPAL TUNNEL SYNDROME, RIGHT: ICD-10-CM

## 2023-11-27 PROCEDURE — 3600000003 HC OR TIME - INITIAL BASE CHARGE - PROCEDURE LEVEL THREE: Performed by: ORTHOPAEDIC SURGERY

## 2023-11-27 PROCEDURE — 3600000008 HC OR TIME - EACH INCREMENTAL 1 MINUTE - PROCEDURE LEVEL THREE: Performed by: ORTHOPAEDIC SURGERY

## 2023-11-27 PROCEDURE — A4217 STERILE WATER/SALINE, 500 ML: HCPCS | Performed by: ORTHOPAEDIC SURGERY

## 2023-11-27 PROCEDURE — 2500000001 HC RX 250 WO HCPCS SELF ADMINISTERED DRUGS (ALT 637 FOR MEDICARE OP)

## 2023-11-27 PROCEDURE — C9353 NEURAWRAP NERVE PROTECTOR,CM: HCPCS | Performed by: ORTHOPAEDIC SURGERY

## 2023-11-27 PROCEDURE — 3700000001 HC GENERAL ANESTHESIA TIME - INITIAL BASE CHARGE: Performed by: ORTHOPAEDIC SURGERY

## 2023-11-27 PROCEDURE — 2500000005 HC RX 250 GENERAL PHARMACY W/O HCPCS: Performed by: ORTHOPAEDIC SURGERY

## 2023-11-27 PROCEDURE — 2500000004 HC RX 250 GENERAL PHARMACY W/ HCPCS (ALT 636 FOR OP/ED)

## 2023-11-27 PROCEDURE — 64721 CARPAL TUNNEL SURGERY: CPT | Performed by: ORTHOPAEDIC SURGERY

## 2023-11-27 PROCEDURE — 7100000010 HC PHASE TWO TIME - EACH INCREMENTAL 1 MINUTE: Performed by: ORTHOPAEDIC SURGERY

## 2023-11-27 PROCEDURE — 2500000004 HC RX 250 GENERAL PHARMACY W/ HCPCS (ALT 636 FOR OP/ED): Performed by: ORTHOPAEDIC SURGERY

## 2023-11-27 PROCEDURE — A64721 PR REVISE MEDIAN N/CARPAL TUNNEL SURG

## 2023-11-27 PROCEDURE — 2780000003 HC OR 278 NO HCPCS: Performed by: ORTHOPAEDIC SURGERY

## 2023-11-27 PROCEDURE — 3700000002 HC GENERAL ANESTHESIA TIME - EACH INCREMENTAL 1 MINUTE: Performed by: ORTHOPAEDIC SURGERY

## 2023-11-27 PROCEDURE — A64721 PR REVISE MEDIAN N/CARPAL TUNNEL SURG: Performed by: ANESTHESIOLOGY

## 2023-11-27 PROCEDURE — 7100000009 HC PHASE TWO TIME - INITIAL BASE CHARGE: Performed by: ORTHOPAEDIC SURGERY

## 2023-11-27 PROCEDURE — 99100 ANES PT EXTEME AGE<1 YR&>70: CPT | Performed by: ANESTHESIOLOGY

## 2023-11-27 PROCEDURE — 94760 N-INVAS EAR/PLS OXIMETRY 1: CPT

## 2023-11-27 DEVICE — NERVE PROTECTOR, AXOGUARD, 10MM X 40MM: Type: IMPLANTABLE DEVICE | Site: HAND | Status: FUNCTIONAL

## 2023-11-27 RX ORDER — CEFAZOLIN SODIUM 2 G/100ML
2 INJECTION, SOLUTION INTRAVENOUS ONCE
Status: DISCONTINUED | OUTPATIENT
Start: 2023-11-27 | End: 2023-11-27 | Stop reason: HOSPADM

## 2023-11-27 RX ORDER — MIDAZOLAM HYDROCHLORIDE 1 MG/ML
INJECTION, SOLUTION INTRAMUSCULAR; INTRAVENOUS AS NEEDED
Status: DISCONTINUED | OUTPATIENT
Start: 2023-11-27 | End: 2023-11-27

## 2023-11-27 RX ORDER — OXYCODONE HYDROCHLORIDE 5 MG/1
5 TABLET ORAL EVERY 4 HOURS PRN
Status: CANCELLED | OUTPATIENT
Start: 2023-11-27

## 2023-11-27 RX ORDER — HYDROMORPHONE HYDROCHLORIDE 1 MG/ML
0.5 INJECTION, SOLUTION INTRAMUSCULAR; INTRAVENOUS; SUBCUTANEOUS EVERY 5 MIN PRN
Status: CANCELLED | OUTPATIENT
Start: 2023-11-27

## 2023-11-27 RX ORDER — SODIUM CHLORIDE 0.9 G/100ML
IRRIGANT IRRIGATION AS NEEDED
Status: DISCONTINUED | OUTPATIENT
Start: 2023-11-27 | End: 2023-11-27 | Stop reason: HOSPADM

## 2023-11-27 RX ORDER — CELECOXIB 200 MG/1
CAPSULE ORAL AS NEEDED
Status: DISCONTINUED | OUTPATIENT
Start: 2023-11-27 | End: 2023-11-27

## 2023-11-27 RX ORDER — LIDOCAINE IN NACL,ISO-OSMOT/PF 30 MG/3 ML
0.1 SYRINGE (ML) INJECTION ONCE
Status: CANCELLED | OUTPATIENT
Start: 2023-11-27 | End: 2023-11-27

## 2023-11-27 RX ORDER — GABAPENTIN 300 MG/1
CAPSULE ORAL AS NEEDED
Status: DISCONTINUED | OUTPATIENT
Start: 2023-11-27 | End: 2023-11-27

## 2023-11-27 RX ORDER — SODIUM CHLORIDE, SODIUM LACTATE, POTASSIUM CHLORIDE, CALCIUM CHLORIDE 600; 310; 30; 20 MG/100ML; MG/100ML; MG/100ML; MG/100ML
100 INJECTION, SOLUTION INTRAVENOUS CONTINUOUS
Status: CANCELLED | OUTPATIENT
Start: 2023-11-27

## 2023-11-27 RX ORDER — ACETAMINOPHEN 325 MG/1
TABLET ORAL AS NEEDED
Status: DISCONTINUED | OUTPATIENT
Start: 2023-11-27 | End: 2023-11-27

## 2023-11-27 RX ORDER — FENTANYL CITRATE 50 UG/ML
INJECTION, SOLUTION INTRAMUSCULAR; INTRAVENOUS AS NEEDED
Status: DISCONTINUED | OUTPATIENT
Start: 2023-11-27 | End: 2023-11-27

## 2023-11-27 RX ORDER — PROPOFOL 10 MG/ML
INJECTION, EMULSION INTRAVENOUS AS NEEDED
Status: DISCONTINUED | OUTPATIENT
Start: 2023-11-27 | End: 2023-11-27

## 2023-11-27 RX ORDER — BUPIVACAINE HYDROCHLORIDE 5 MG/ML
INJECTION, SOLUTION EPIDURAL; INTRACAUDAL AS NEEDED
Status: DISCONTINUED | OUTPATIENT
Start: 2023-11-27 | End: 2023-11-27 | Stop reason: HOSPADM

## 2023-11-27 RX ORDER — PROPOFOL 10 MG/ML
INJECTION, EMULSION INTRAVENOUS CONTINUOUS PRN
Status: DISCONTINUED | OUTPATIENT
Start: 2023-11-27 | End: 2023-11-27

## 2023-11-27 RX ORDER — LABETALOL HYDROCHLORIDE 5 MG/ML
5 INJECTION, SOLUTION INTRAVENOUS ONCE AS NEEDED
Status: CANCELLED | OUTPATIENT
Start: 2023-11-27

## 2023-11-27 RX ORDER — ONDANSETRON HYDROCHLORIDE 2 MG/ML
4 INJECTION, SOLUTION INTRAVENOUS ONCE AS NEEDED
Status: CANCELLED | OUTPATIENT
Start: 2023-11-27

## 2023-11-27 RX ORDER — CEFAZOLIN SODIUM 2 G/50ML
SOLUTION INTRAVENOUS AS NEEDED
Status: DISCONTINUED | OUTPATIENT
Start: 2023-11-27 | End: 2023-11-27

## 2023-11-27 RX ORDER — HYDROCODONE BITARTRATE AND ACETAMINOPHEN 5; 325 MG/1; MG/1
1 TABLET ORAL EVERY 4 HOURS PRN
Qty: 14 TABLET | Refills: 0 | Status: SHIPPED | OUTPATIENT
Start: 2023-11-27 | End: 2023-12-01

## 2023-11-27 RX ORDER — ALBUTEROL SULFATE 0.83 MG/ML
2.5 SOLUTION RESPIRATORY (INHALATION) ONCE AS NEEDED
Status: CANCELLED | OUTPATIENT
Start: 2023-11-27

## 2023-11-27 RX ORDER — LIDOCAINE HYDROCHLORIDE 10 MG/ML
INJECTION INFILTRATION; PERINEURAL AS NEEDED
Status: DISCONTINUED | OUTPATIENT
Start: 2023-11-27 | End: 2023-11-27 | Stop reason: HOSPADM

## 2023-11-27 RX ADMIN — CEFAZOLIN SODIUM 2 G: 2 SOLUTION INTRAVENOUS at 11:55

## 2023-11-27 RX ADMIN — PROPOFOL 30 MG: 10 INJECTION, EMULSION INTRAVENOUS at 12:00

## 2023-11-27 RX ADMIN — GABAPENTIN 300 MG: 300 CAPSULE ORAL at 11:32

## 2023-11-27 RX ADMIN — PROPOFOL 150 MCG/KG/MIN: 10 INJECTION, EMULSION INTRAVENOUS at 11:55

## 2023-11-27 RX ADMIN — MIDAZOLAM 2 MG: 1 INJECTION INTRAMUSCULAR; INTRAVENOUS at 11:49

## 2023-11-27 RX ADMIN — FENTANYL CITRATE 25 MCG: 50 INJECTION, SOLUTION INTRAMUSCULAR; INTRAVENOUS at 12:02

## 2023-11-27 RX ADMIN — CELECOXIB 200 MG: 200 CAPSULE ORAL at 11:32

## 2023-11-27 RX ADMIN — ACETAMINOPHEN 975 MG: 325 TABLET ORAL at 11:32

## 2023-11-27 RX ADMIN — SODIUM CHLORIDE, SODIUM LACTATE, POTASSIUM CHLORIDE, AND CALCIUM CHLORIDE: .6; .31; .03; .02 INJECTION, SOLUTION INTRAVENOUS at 11:49

## 2023-11-27 ASSESSMENT — PAIN SCALES - GENERAL
PAINLEVEL_OUTOF10: 0 - NO PAIN

## 2023-11-27 ASSESSMENT — PAIN - FUNCTIONAL ASSESSMENT
PAIN_FUNCTIONAL_ASSESSMENT: 0-10

## 2023-11-27 NOTE — ANESTHESIA POSTPROCEDURE EVALUATION
Patient: Alida Drew    Procedure Summary       Date: 11/27/23 Room / Location: [facility] OR 02 / [facility] OR    Anesthesia Start: 1149 Anesthesia Stop: 1257    Procedure: Revision right carpal tunnel release with scar barrier placement (Right: Hand) Diagnosis:       Carpal tunnel syndrome, right      (Carpal tunnel syndrome, right [G56.01])    Surgeons: Alessandro Thomas MD Responsible Provider: Ángela Aguilera DO    Anesthesia Type: MAC ASA Status: 2            Anesthesia Type: MAC    Vitals Value Taken Time   /68 11/27/23 1329   Temp 36.6 °C (97.9 °F) 11/27/23 1329   Pulse 68 11/27/23 1329   Resp 17 11/27/23 1329   SpO2 96 % 11/27/23 1329       Anesthesia Post Evaluation    Patient location during evaluation: PACU  Patient participation: complete - patient participated  Level of consciousness: awake and alert  Pain management: satisfactory to patient  Multimodal analgesia pain management approach  Airway patency: patent  Cardiovascular status: acceptable  Respiratory status: acceptable  Hydration status: acceptable  Postoperative Nausea and Vomiting: none    No notable events documented.

## 2023-11-27 NOTE — DISCHARGE INSTRUCTIONS
Follow-Up Instructions    You will need to be seen in clinic in 10-15 days for a post-operative evaluation.  This appointment will be in the outpatient office, not at the Surgery Center.    You will need to call Hattie in my office and schedule an appointment, unless there is a previous appointment that appears on your discharge instructions.  Her phone number is [phone].  Please do not delay in calling to make this appointment.      Activity Restrictions    1)  No driving for 24 hours after surgery, due to the anesthetic.    2)  No driving or operating heavy machinery while taking narcotic pain medication.    3)  Weight bearing as tolerated.  Light use of the fingers (writing, typing, texting) is good to do.     Discharge Medications    A prescription for a narcotic pain medication (Norco) has been sent to your pharmacy of record.  I do not expect you will need this, but wanted you to have it available as an option if over-the-counter medications are not adequately controlling your pain.  Most people simply take Tylenol, Motrin, Advil, or other anti-inflammatory for the pain.  If you do end up taking the prescription medication, please try to wean yourself off this as quickly as possible.    You can add the prescription medication to the anti-inflammatories if needed, but should not add it to Tylenol, as there is already Tylenol in the prescription.    Wound care instructions:     1)  Leave operative dressing in place for 7 days.  If you shower, cover the hand with a plastic bag and elevate it so the water cannot run down into the bag.    2)  After 7 days, remove the bandage and leave the incision open to air, or cover with a simple Band-Aid.   At that point, you may let water run freely over incision when showering.  Do not scrub.  Do not soak in pool or tub, or submerge the incision until you are fully 21 days from surgery.    3)  Call if any drainage after 7 days, increased redness/warmth/swelling at  incision site, abnormal pain/tenderness of the extremity, abnormal swelling of the extremity that does not respond to elevation, shortness of breath, or chest pain.    May have Tylenol after: 5:30 PM 11/27/23 do not use if you are taking the prescribed pain medication, it also contains tylenol     May have Ibuprofen/advil/motrin/aleve after: 11:30AM 11/28/23     To reach your physician after hours call: [phone] ask for orthopedic resident on call

## 2023-11-27 NOTE — ANESTHESIA PREPROCEDURE EVALUATION
Patient: Alida Drew    Procedure Information       Date/Time: 11/27/23 1200    Procedure: Revision right carpal tunnel release with possible scar barrier placement (Right: Hand)    Location: [facility] OR 02 / [facility]    Surgeons: Alessandro Thomas MD            Relevant Problems   Anesthesia   No history of complications History of anesthesia complications      Cardiovascular  > 4 mets   (+) Hyperlipidemia      Endocrine (within normal limits)      GI   (+) GERD (gastroesophageal reflux disease)      /Renal (within normal limits)      Neuro/Psych   (+) Carpal tunnel syndrome of right wrist   (+) Carpal tunnel syndrome, right   (+) Depression      Pulmonary (within normal limits)      GI/Hepatic (within normal limits)      Musculoskeletal   (+) Carpal tunnel syndrome of right wrist   (+) Carpal tunnel syndrome, right      Infectious Disease   (+) Acute bacterial bronchitis       Clinical information reviewed:   Tobacco  Allergies  Meds  Problems  Med Hx  Surg Hx   Fam Hx  Soc   Hx        NPO Detail:  NPO/Void Status  Date of Last Liquid: 11/26/23  Time of Last Liquid: 2100  Date of Last Solid: 11/26/23  Time of Last Solid: 2100  Time of Last Void: 1125         Physical Exam    Airway  Mallampati: II  TM distance: >3 FB  Neck ROM: full     Cardiovascular    Dental - normal exam     Pulmonary    Abdominal        Anesthesia Plan    ASA 2     MAC     Anesthetic plan and risks discussed with patient.    Plan discussed with CAA.

## 2023-11-27 NOTE — OP NOTE
ORTHOPEDIC OPERATIVE NOTE    Name:     Alida Drew  :     1945  Facility:    [facility]  Date of Surgery:   2023     PREOP DX:          Right Recurrent carpal Tunnel Syndrome    POSTOP DX:       Same    PROCEDURE:     Revision Right Carpal Tunnel Release with Scar Barrier Placement    SURGEON: JR William MD    RESIDENT/FELLOW/ASSISTANT:  Milo Dutton MD    ANESTHESIA:    MAC Sedation + Local    ESTIMATED BLOOD LOSS :   2 ml    TOTAL FLUIDS:     200 cc LR    SPECIMEN:   None    TOURNIQUET TIME: 24 minutes at 250 mmHg    FINDINGS: Tight, thick transverse carpal ligament, dense surrounding scar    COMPLICATIONS:  None    PATIENT RETURNED TO/CONDITION:  PACU in Good      INDICATIONS:      Alida Drew is a 78 y.o.,  right-hand dominant female who presents with numbness and tingling in the median nerve distribution of the right hand that has failed to respond to conservative measures.  She had undergone a right carpal tunnel release by Dr. Boogie Conde in , initially with good relief.  Symptoms have been back for over a year.  Neuromuscular ultrasound verified compression of the nerve with a positive notch sign, limited glide, and proximal expansion.  She is here for elective right revision carpal tunnel release and probable scar barrier placement.  I reminded her of surgical risks of infection; scarring; damage to nerves, tendons, or vessels; stiffness; wound healing problems; failure to relieve symptoms; recurrent symptoms; reaction to the scar barrier material; and pillar pain.  She wished to proceed.     NARRATIVE:    Following identification of the patient and confirmation of correct site of surgery and signed operative consent, she was brought to the operating room and a hand table affixed to the cart.  A light MAC sedation was administered by Anesthesia along with IV antibiotic dose.  A pneumatic tourniquet was placed high on the right arm, and the limb was prepped from  fingertip to cuff with chlorhexidine, and draped free in the usual sterile fashion.  10 mL of a mix of 0.5% Marcaine and 1% lidocaine, plain, was instilled to the planned incision area. The limb was exsanguinated with an Esmarch, and the tourniquet inflated.    A 4 cm carpal tunnel incision was made through the previous well-healed scar, and taken bluntly down under high loupe magnification to the palmar fascia, which was divided in line with its fibers.  Further careful blunt dissection revealed the distal edge of the transverse carpal ligament.  The ligament was carefully, sharply, sequentially divided under direct vision as the contents of the carpal tunnel were carefully protected.  This division was done with scissors.  There was good refill of the longitudinal vessel.  There was quite significant scarring between the nerve and both the radial and ulnar walls of the tunnel.  This was very carefully dissected apart by combination of sharp and blunt technique.  Vessel loops were used to help mobilize.  The adhesions to the underlying tendons were more minimal and easily divided.  With the nerve completely mobilized, a 10 x 40 mm AxoGuard scar barrier was reconstituted according  instructions and carefully wrapped around the nerve.  It was then sutured to itself with interrupted 5-0 Prolene horizontal mattress.  This nicely restored good glide between the tunnel wall, flexor tendon, and nerve.    The tourniquet was deflated, and pink color rapidly returned to all digits.  Meticulous hemostasis was achieved with bipolar.  After final irrigation, skin was closed with 4-0 vicryl subcutaneous and 4-0 Monocryl skin stitch, and a soft dressing applied.  Patient was awakened and transferred to Recovery in stable condition.          Electronically signed  YNES Thomas MD  [phone]

## 2023-11-28 ASSESSMENT — PAIN SCALES - GENERAL: PAINLEVEL_OUTOF10: 0 - NO PAIN

## 2023-12-04 ENCOUNTER — OFFICE VISIT (OUTPATIENT)
Dept: GASTROENTEROLOGY | Facility: CLINIC | Age: 78
End: 2023-12-04
Payer: MEDICARE

## 2023-12-04 VITALS — OXYGEN SATURATION: 96 % | BODY MASS INDEX: 29.23 KG/M2 | HEART RATE: 95 BPM | HEIGHT: 61 IN | WEIGHT: 154.8 LBS

## 2023-12-04 DIAGNOSIS — Z12.11 COLON CANCER SCREENING: Primary | ICD-10-CM

## 2023-12-04 PROCEDURE — 1126F AMNT PAIN NOTED NONE PRSNT: CPT | Performed by: INTERNAL MEDICINE

## 2023-12-04 PROCEDURE — 1159F MED LIST DOCD IN RCRD: CPT | Performed by: INTERNAL MEDICINE

## 2023-12-04 PROCEDURE — 1160F RVW MEDS BY RX/DR IN RCRD: CPT | Performed by: INTERNAL MEDICINE

## 2023-12-04 PROCEDURE — 1036F TOBACCO NON-USER: CPT | Performed by: INTERNAL MEDICINE

## 2023-12-04 PROCEDURE — 99203 OFFICE O/P NEW LOW 30 MIN: CPT | Performed by: INTERNAL MEDICINE

## 2023-12-04 ASSESSMENT — ENCOUNTER SYMPTOMS: BACK PAIN: 1

## 2023-12-04 NOTE — PROGRESS NOTES
"Subjective     History of Present Illness:     79yo with DL, GERD, DM, COPD,  seen for surveillance colonoscopy.  Brother at 44 yo had colon cancer.   Has had polyps remotely  Last 2 colonoscopies 2013, 2018 no polyps.  Denies any rectal bleeding, abd pain .  Recently on oxycodone for carpal tunnel surgery this week and sluggish bowels that she has corrected with eating high fiber food.  No complaints, feels well.    EGD 2018- small hiatal hernia.  Colonoscopy 2018- (h/o polyps, fhx crc) Sree-  diverticulosis        Review of Systems  Review of Systems   Musculoskeletal:  Positive for back pain.       Past Medical History  Past Medical History:   Diagnosis Date    Diabetes mellitus (CMS/HCC)     \"borderline high\"    Disorder of arteries and arterioles, unspecified (CMS/HCC)     Disorder of arteries and arterioles    Elevated blood-pressure reading, without diagnosis of hypertension     Elevated blood pressure reading without diagnosis of hypertension    GERD (gastroesophageal reflux disease)     Hyperlipidemia     Other conditions influencing health status     Disorder Of The Pelvis / Hip Joint / Femur    Other headache syndrome     Chronic mixed headache syndrome    Other intervertebral disc degeneration, lumbar region     Disc degeneration, lumbar    Palmar fascial fibromatosis (dupuytren)     Dupuytren's contracture    Personal history of nicotine dependence     History of nicotine dependence    Personal history of other diseases of the musculoskeletal system and connective tissue     History of low back pain    Personal history of other endocrine, nutritional and metabolic disease     History of diabetes mellitus    Personal history of other endocrine, nutritional and metabolic disease     History of hyperlipidemia    Personal history of other specified conditions     History of headache    Scoliosis, unspecified     Scoliosis    Spondylosis without myelopathy or radiculopathy, lumbar region     Lumbar " spondylosis    Synovitis and tenosynovitis, unspecified 2018    Tenosynovitis of finger, hand, or wrist    Unspecified condition associated with female genital organs and menstrual cycle     Genital symptoms, female       Social History  Social History     Tobacco Use    Smoking status: Former     Types: Cigarettes     Quit date:      Years since quittin.9    Smokeless tobacco: Never   Substance Use Topics    Alcohol use: Yes     Alcohol/week: 2.0 standard drinks of alcohol     Types: 2 Glasses of wine per week    Drug use: Never       Family History  Family History   Problem Relation Name Age of Onset    Other (Benign polyps of the large intestine) Mother      Colon cancer Brother      Heart failure Other          Allergies  Allergies   Allergen Reactions    Adhesive Tape-Silicones Unknown    Egg Unknown    Sulfa (Sulfonamide Antibiotics) Unknown     as a child, pt unaware of reaction       Medications  Current Outpatient Medications   Medication Instructions    ascorbic acid, vitamin C, 500 mg capsule oral    beta carotene (Vitamin A) 10,000 unit capsule oral    biotin 5 mg capsule oral    cholecalciferol (Vitamin D-3) 25 MCG (1000 UT) tablet 1 tablet, oral, Daily    Cinnamon 500 mg capsule oral, 2 times daily    diphenhydrAMINE-acetaminophen (Tylenol PM)  mg per tablet 1 tablet, oral, Nightly PRN    escitalopram (Lexapro) 10 mg tablet 1 tablet, oral, Daily    flaxseed oiL 1,000 mg capsule oral    fluocinonide 0.05 % cream Topical, 2 times daily, to affected area    folic acid/vit B complex and C (B complex-vitamin C-folic acid) 400 mcg tablet extended release 1 tablet, oral, Daily    gabapentin (NEURONTIN) 300 mg, oral, 2 times daily    glimepiride (Amaryl) 2 mg tablet TAKE 1/2 TABLET BY MOUTH ONCE EVERYDAY    glucosamine/chondr rangel A sod (glucosamine-chondroitin) 750-600 mg tablet tablet oral, 2 times daily    omega-3 fatty acids-fish oil (Fish OiL) 340-1,000 mg capsule oral, 2 times daily     RABEprazole (ACIPHEX) 20 mg, oral, Daily    rosuvastatin (Crestor) 10 mg tablet TAKE 1 TABLET BY MOUTH EVERY DAY AT BEDTIME AS DIRECTED        Objective   There were no vitals taken for this visit.   Physical Exam    Deferred asymptomatic       Assessment/Plan     77yo with DL, GERD, DM, COPD,  seen for surveillance colonoscopy. Brother with crc at 44 yo and patient with remote colon polyps. Last 2 colonoscopies over the past 10 yrs normal without polyps.  Discussed that risk of colon cancer going forward is extremely low with no polyps in last 10 yrs.  Pt would like to decline colonoscopy at this time. She will contact office if any change in symptoms or would like to pursue colonoscopy.      Merlene Ledezma MD

## 2023-12-06 ENCOUNTER — HOSPITAL ENCOUNTER (EMERGENCY)
Facility: HOSPITAL | Age: 78
Discharge: HOME | End: 2023-12-06
Attending: EMERGENCY MEDICINE
Payer: MEDICARE

## 2023-12-06 ENCOUNTER — APPOINTMENT (OUTPATIENT)
Dept: RADIOLOGY | Facility: HOSPITAL | Age: 78
End: 2023-12-06
Payer: MEDICARE

## 2023-12-06 VITALS
BODY MASS INDEX: 27.6 KG/M2 | OXYGEN SATURATION: 96 % | SYSTOLIC BLOOD PRESSURE: 139 MMHG | RESPIRATION RATE: 18 BRPM | HEART RATE: 69 BPM | DIASTOLIC BLOOD PRESSURE: 71 MMHG | WEIGHT: 150 LBS | TEMPERATURE: 98.1 F | HEIGHT: 62 IN

## 2023-12-06 DIAGNOSIS — T81.49XA SURGICAL SITE INFECTION: Primary | ICD-10-CM

## 2023-12-06 PROCEDURE — 73110 X-RAY EXAM OF WRIST: CPT | Mod: RT,FY

## 2023-12-06 PROCEDURE — 2500000004 HC RX 250 GENERAL PHARMACY W/ HCPCS (ALT 636 FOR OP/ED): Performed by: STUDENT IN AN ORGANIZED HEALTH CARE EDUCATION/TRAINING PROGRAM

## 2023-12-06 PROCEDURE — 96365 THER/PROPH/DIAG IV INF INIT: CPT

## 2023-12-06 PROCEDURE — 73110 X-RAY EXAM OF WRIST: CPT | Mod: RIGHT SIDE | Performed by: RADIOLOGY

## 2023-12-06 PROCEDURE — 99284 EMERGENCY DEPT VISIT MOD MDM: CPT | Performed by: EMERGENCY MEDICINE

## 2023-12-06 PROCEDURE — 99284 EMERGENCY DEPT VISIT MOD MDM: CPT | Mod: 25 | Performed by: EMERGENCY MEDICINE

## 2023-12-06 PROCEDURE — 2500000001 HC RX 250 WO HCPCS SELF ADMINISTERED DRUGS (ALT 637 FOR MEDICARE OP): Performed by: STUDENT IN AN ORGANIZED HEALTH CARE EDUCATION/TRAINING PROGRAM

## 2023-12-06 RX ORDER — TRAMADOL HYDROCHLORIDE 50 MG/1
25 TABLET ORAL ONCE
Status: COMPLETED | OUTPATIENT
Start: 2023-12-06 | End: 2023-12-06

## 2023-12-06 RX ORDER — AMOXICILLIN AND CLAVULANATE POTASSIUM 875; 125 MG/1; MG/1
1 TABLET, FILM COATED ORAL EVERY 12 HOURS
Qty: 20 TABLET | Refills: 0 | Status: SHIPPED | OUTPATIENT
Start: 2023-12-06 | End: 2023-12-16

## 2023-12-06 RX ADMIN — PIPERACILLIN SODIUM AND TAZOBACTAM SODIUM 3.38 G: 3; .375 INJECTION, SOLUTION INTRAVENOUS at 11:54

## 2023-12-06 RX ADMIN — TRAMADOL HYDROCHLORIDE 25 MG: 50 TABLET, COATED ORAL at 11:41

## 2023-12-06 ASSESSMENT — LIFESTYLE VARIABLES
EVER HAD A DRINK FIRST THING IN THE MORNING TO STEADY YOUR NERVES TO GET RID OF A HANGOVER: NO
HAVE PEOPLE ANNOYED YOU BY CRITICIZING YOUR DRINKING: NO
EVER FELT BAD OR GUILTY ABOUT YOUR DRINKING: NO
HAVE YOU EVER FELT YOU SHOULD CUT DOWN ON YOUR DRINKING: NO
REASON UNABLE TO ASSESS: NO

## 2023-12-06 ASSESSMENT — PAIN DESCRIPTION - LOCATION: LOCATION: HAND

## 2023-12-06 ASSESSMENT — PAIN DESCRIPTION - ORIENTATION: ORIENTATION: RIGHT

## 2023-12-06 ASSESSMENT — PAIN DESCRIPTION - PAIN TYPE: TYPE: ACUTE PAIN

## 2023-12-06 ASSESSMENT — PAIN SCALES - GENERAL: PAINLEVEL_OUTOF10: 5 - MODERATE PAIN

## 2023-12-06 ASSESSMENT — COLUMBIA-SUICIDE SEVERITY RATING SCALE - C-SSRS
6. HAVE YOU EVER DONE ANYTHING, STARTED TO DO ANYTHING, OR PREPARED TO DO ANYTHING TO END YOUR LIFE?: NO
2. HAVE YOU ACTUALLY HAD ANY THOUGHTS OF KILLING YOURSELF?: NO
1. IN THE PAST MONTH, HAVE YOU WISHED YOU WERE DEAD OR WISHED YOU COULD GO TO SLEEP AND NOT WAKE UP?: NO

## 2023-12-06 ASSESSMENT — PAIN - FUNCTIONAL ASSESSMENT: PAIN_FUNCTIONAL_ASSESSMENT: 0-10

## 2023-12-06 NOTE — ED PROVIDER NOTES
"HPI   Chief Complaint   Patient presents with    Wound Check     Post op from carpel tunnel surgery 11/27/23. Reports increasing pain/ swelling       78-year-old female who underwent revision of carpal tunnel surgery several weeks ago who presents to the emergency department with right hand swelling and redness.  She states that has been painful, she attempted to get in touch with her surgeon, however the office staff was not very helpful, she has a follow-up appointment scheduled on the 20th.  She has had infections get bad before, and she does not want to wait until this gets bad.  She is having some pain and swelling but has had not no obvious purulent discharge, no tracking redness up the arm, no fevers, and she is still able to flex and extend her fingers without pain.  She has limited opposition against the fifth and fourth digits but is able to oppose the second and third.  States that sensation is intact in the fingers.                          No data recorded                Patient History   Past Medical History:   Diagnosis Date    Diabetes mellitus (CMS/HCC)     \"borderline high\"    Disorder of arteries and arterioles, unspecified (CMS/HCC)     Disorder of arteries and arterioles    Elevated blood-pressure reading, without diagnosis of hypertension     Elevated blood pressure reading without diagnosis of hypertension    GERD (gastroesophageal reflux disease)     Hyperlipidemia     Other conditions influencing health status     Disorder Of The Pelvis / Hip Joint / Femur    Other headache syndrome     Chronic mixed headache syndrome    Other intervertebral disc degeneration, lumbar region     Disc degeneration, lumbar    Palmar fascial fibromatosis (dupuytren)     Dupuytren's contracture    Personal history of nicotine dependence     History of nicotine dependence    Personal history of other diseases of the musculoskeletal system and connective tissue     History of low back pain    Personal history of " other endocrine, nutritional and metabolic disease     History of diabetes mellitus    Personal history of other endocrine, nutritional and metabolic disease     History of hyperlipidemia    Personal history of other specified conditions     History of headache    Scoliosis, unspecified     Scoliosis    Spondylosis without myelopathy or radiculopathy, lumbar region     Lumbar spondylosis    Synovitis and tenosynovitis, unspecified 2018    Tenosynovitis of finger, hand, or wrist    Unspecified condition associated with female genital organs and menstrual cycle     Genital symptoms, female     Past Surgical History:   Procedure Laterality Date    OTHER SURGICAL HISTORY  2021    Complete colonoscopy    OTHER SURGICAL HISTORY  2021    Esophagogastroduodenoscopy    SPINE SURGERY  01/15/2014    Spine Repair    TOTAL HIP ARTHROPLASTY  2015    Hip Replacement     Family History   Problem Relation Name Age of Onset    Other (Benign polyps of the large intestine) Mother      Colon cancer Brother      Heart failure Other       Social History     Tobacco Use    Smoking status: Former     Types: Cigarettes     Quit date:      Years since quittin.9    Smokeless tobacco: Never   Substance Use Topics    Alcohol use: Yes     Alcohol/week: 2.0 standard drinks of alcohol     Types: 2 Glasses of wine per week    Drug use: Never       Physical Exam   ED Triage Vitals [23 0933]   Temp Heart Rate Resp BP   36.7 °C (98.1 °F) 84 18 154/69      SpO2 Temp Source Heart Rate Source Patient Position   96 % Temporal Monitor --      BP Location FiO2 (%)     -- --       Physical Exam  Vitals and nursing note reviewed.   Constitutional:       General: She is not in acute distress.     Appearance: She is well-developed.   HENT:      Head: Normocephalic and atraumatic.   Eyes:      Conjunctiva/sclera: Conjunctivae normal.   Cardiovascular:      Rate and Rhythm: Normal rate and regular rhythm.      Heart sounds: No  murmur heard.  Pulmonary:      Effort: Pulmonary effort is normal. No respiratory distress.      Breath sounds: Normal breath sounds.   Abdominal:      Palpations: Abdomen is soft.      Tenderness: There is no abdominal tenderness.   Musculoskeletal:         General: No swelling.      Cervical back: Neck supple.      Comments: Right hand with healing incision across the mid palm approximately 3 cm long, with surrounding erythema and induration approximately 1 cm in either direction.  No tracking redness of the arm, no purulent discharge, no areas of fluctuance.  Finger swelling, but neurovascularly intact in the fingers.   Skin:     General: Skin is warm and dry.      Capillary Refill: Capillary refill takes less than 2 seconds.   Neurological:      Mental Status: She is alert.   Psychiatric:         Mood and Affect: Mood normal.         ED Course & MDM   Diagnoses as of 12/06/23 2235   Surgical site infection       Medical Decision Making  History obtained from: Patient    External records reviewed: I reviewed external records including outpatient, PCP records, and prior discharge summaries    ED Course: No tracking redness of the arm, no obvious areas of purulent collection, x-rays obtained and no gas noted in the tissues, and there is no subcutaneous emphysema.  Neurovascularly intact in the hand.  Given her first dose of IV Zosyn here, will discharge on Augmentin, given strict return precautions and will maintain her follow-up appointment with William on the 20th.    I have reviewed this case with the ED attending physician, and the attending agrees with the plan. Patient or family was counselled regarding labs, imaging, likely diagnosis, and plan. All questions were answered.     Katherine Mclean, DO  PGY-4, emergency medicine    The above documentation was completed with the use of speech recognition software. It may contain dictation errors secondary to limitations of the  software.          Procedure  Procedures     Katherine Mclean DO  Resident  12/06/23 2240

## 2023-12-06 NOTE — DISCHARGE INSTRUCTIONS
If you develop fevers, if the limb becomes hard, hot, swollen, red, or if it becomes cold and white or if you lose feeling, or if you become unable to move it, or if you develop tracking redness of the limb, please return to the emergency department immediately for reevaluation.

## 2023-12-20 ENCOUNTER — OFFICE VISIT (OUTPATIENT)
Dept: ORTHOPEDIC SURGERY | Facility: CLINIC | Age: 78
End: 2023-12-20
Payer: MEDICARE

## 2023-12-20 DIAGNOSIS — G56.01 CARPAL TUNNEL SYNDROME, RIGHT: Primary | ICD-10-CM

## 2023-12-20 PROCEDURE — 1159F MED LIST DOCD IN RCRD: CPT | Performed by: ORTHOPAEDIC SURGERY

## 2023-12-20 PROCEDURE — 1160F RVW MEDS BY RX/DR IN RCRD: CPT | Performed by: ORTHOPAEDIC SURGERY

## 2023-12-20 PROCEDURE — 1126F AMNT PAIN NOTED NONE PRSNT: CPT | Performed by: ORTHOPAEDIC SURGERY

## 2023-12-20 PROCEDURE — 1036F TOBACCO NON-USER: CPT | Performed by: ORTHOPAEDIC SURGERY

## 2023-12-20 PROCEDURE — 99024 POSTOP FOLLOW-UP VISIT: CPT | Performed by: ORTHOPAEDIC SURGERY

## 2023-12-20 NOTE — LETTER
December 25, 2023     Frederick Atwood MD  [address]  [facility], [facility] [address]  [facility] [address]    Patient: Alida Drew   YOB: 1945   Date of Visit: 12/20/2023       Dear Dr. Frederick Atwood MD:    Thank you for referring Alida Drew to me for evaluation. Below are my notes for this consultation.  If you have questions, please do not hesitate to call me. I look forward to following your patient along with you.       Sincerely,     Alessandro Gardner MD      CC: No Recipients  ______________________________________________________________________________________    CHIEF COMPLAINT:    Post op visit s/p revision right carpal tunnel release with scar barrier placement 11/27/2023 w Dr. gardner    ASSESSMENT + PLAN    Postop day 22 from right carpal tunnel revision release with scar barrier placement    The incision is healing normally.  Sutures are absorbable.  You may get this wet, and may advance activity as pain allows.  Work on the stretching exercises that I demonstrated. Contact my office if you would like a formal occupational therapy referral.  I reviewed the long expected duration of mild swelling and discomfort given the larger dissection necessary for these revision nerve surgeries.    Follow up with any concerns.    HISTORY OF PRESENT ILLNESS       Patient is a 78 y.o. female who presents today for her first postoperative evaluation of her right wrist s/p revision right carpal tunnel release with scar barrier placement 11/27/2023, now postop day 22. Patient says her postop course was complicated by increased erythema and swelling about the incision site as compared to her previous surgery at that site. Patient went to the emergency department on 12/6/2023 for this redness/swelling and was placed on Augmentin which she just completed yesterday. Patient endorses some pain about the incision site but otherwise denies numbness, tingling, fever, chills, drainage or any other  "postoperative complications or events.  Her preoperative tingling in the fingers is actually already very much improved      PHYSICAL EXAM    Constitutional:    Appears stated age. Well-developed and well-nourished .  Psychiatric:         Pleasant normal mood and affect. Behavior is appropriate for the situation.   Head:                   Normocephalic and atraumatic.  Eyes:                    Pupils are equal and round.  Cardiovascular:  2+ radial and ulnar pulses. Fingers well-perfused.  Respiratory:        Effort normal. No respiratory distress. Speaking in complete sentences.  Neurologic:       Alert and oriented to person, place, and time.  Skin:                Skin is intact, warm and dry.  Hematologic / Lymphatic:    No lymphedema or lymphangitis.    Extremities / Musculoskeletal:                  Upper extremity:   -Midline carpal tunnel incision without dehiscence or drainage. There is mild erythema about the incision, appropriate for current stage of healing, given the scar barrier placement.   -Mild tenderness about incision without significant decrease in wrist or phalange ROM  -Fires axillary/AIN/PIN/ulnar distributions  -SILT axillary/radial/median/ulnar distributions  -Hand warm, well perfused  -Palpable radial pulse, cap refill brisk  -Compartments soft and compressible     Sensation intact to light touch in all distributions.  Capillary refill less than 2 seconds.      IMAGING / LABS / EMGs    None today      Past Medical History:   Diagnosis Date   • Diabetes mellitus (CMS/HCC)     \"borderline high\"   • Disorder of arteries and arterioles, unspecified (CMS/HCC)     Disorder of arteries and arterioles   • Elevated blood-pressure reading, without diagnosis of hypertension     Elevated blood pressure reading without diagnosis of hypertension   • GERD (gastroesophageal reflux disease)    • Hyperlipidemia    • Other conditions influencing health status     Disorder Of The Pelvis / Hip Joint / Femur   • " Other headache syndrome     Chronic mixed headache syndrome   • Other intervertebral disc degeneration, lumbar region     Disc degeneration, lumbar   • Palmar fascial fibromatosis (dupuytren)     Dupuytren's contracture   • Personal history of nicotine dependence     History of nicotine dependence   • Personal history of other diseases of the musculoskeletal system and connective tissue     History of low back pain   • Personal history of other endocrine, nutritional and metabolic disease     History of diabetes mellitus   • Personal history of other endocrine, nutritional and metabolic disease     History of hyperlipidemia   • Personal history of other specified conditions     History of headache   • Scoliosis, unspecified     Scoliosis   • Spondylosis without myelopathy or radiculopathy, lumbar region     Lumbar spondylosis   • Synovitis and tenosynovitis, unspecified 08/26/2018    Tenosynovitis of finger, hand, or wrist   • Unspecified condition associated with female genital organs and menstrual cycle     Genital symptoms, female       Medication Documentation Review Audit       Reviewed by Khalida Jarvis RN (Registered Nurse) on 12/06/23 at 0935      Medication Order Taking? Sig Documenting Provider Last Dose Status   ascorbic acid, vitamin C, 500 mg capsule [id number]  Take by mouth. Historical Provider, MD  Active   beta carotene (Vitamin A) 10,000 unit capsule [id number]  Take by mouth. Historical Provider, MD  Active   biotin 5 mg capsule [id number]  Take by mouth. Historical Provider, MD  Active   cholecalciferol (Vitamin D-3) 25 MCG (1000 UT) tablet [id number]  Take 1 tablet (25 mcg) by mouth once daily. Historical Provider, MD  Active   Cinnamon 500 mg capsule [id number]  Take by mouth twice a day. Historical Provider, MD  Active   diphenhydrAMINE-acetaminophen (Tylenol PM)  mg per tablet [id number]  Take 1 tablet by mouth as needed at bedtime for sleep. Historical Provider, MD  Active   escitalopram  (Lexapro) 10 mg tablet [id number]  Take 1 tablet (10 mg) by mouth once daily. Historical Provider, MD  Active   flaxseed oiL 1,000 mg capsule [id number]  Take by mouth. Historical Provider, MD  Active   fluocinonide 0.05 % cream [id number]  Apply topically 2 times a day. to affected area Historical Provider, MD  Active   folic acid/vit B complex and C (B complex-vitamin C-folic acid) 400 mcg tablet extended release [id number]  Take 1 tablet by mouth once daily. Historical Provider, MD  Active   gabapentin (Neurontin) 300 mg capsule [id number]  Take 1 capsule (300 mg) by mouth 2 times a day. Frederick Atwood MD  Active   glimepiride (Amaryl) 2 mg tablet [id number]  TAKE 1/2 TABLET BY MOUTH ONCE EVERYDAY Historical Provider, MD  Active   glucosamine/chondr rangel A sod (glucosamine-chondroitin) 750-600 mg tablet tablet [id number]  Take by mouth twice a day. Historical Provider, MD  Active   HYDROcodone-acetaminophen (Norco) 5-325 mg tablet [id number]  Take 1 tablet by mouth every 4 hours if needed for severe pain (7 - 10) or moderate pain (4 - 6) for up to 4 days. Duncan Dutton MD   23 2359   omega-3 fatty acids-fish oil (Fish OiL) 340-1,000 mg capsule [id number]  Take by mouth twice a day. Historical Provider, MD  Active   RABEprazole (Aciphex) EC tablet [id number]  TAKE 1 TABLET BY MOUTH EVERY DAY Frederick Atwood MD  Active   rosuvastatin (Crestor) 10 mg tablet [id number]  TAKE 1 TABLET BY MOUTH EVERY DAY AT BEDTIME AS DIRECTED Frederick Atwood MD  Active                    Allergies   Allergen Reactions   • Adhesive Tape-Silicones Unknown   • Egg Unknown   • Sulfa (Sulfonamide Antibiotics) Unknown     as a child, pt unaware of reaction       Social History     Socioeconomic History   • Marital status:      Spouse name: Not on file   • Number of children: Not on file   • Years of education: Not on file   • Highest education level: Not on file   Occupational History   • Not on file   Tobacco Use   •  Smoking status: Former     Types: Cigarettes     Quit date:      Years since quittin.9   • Smokeless tobacco: Never   Substance and Sexual Activity   • Alcohol use: Yes     Alcohol/week: 2.0 standard drinks of alcohol     Types: 2 Glasses of wine per week   • Drug use: Never   • Sexual activity: Not on file   Other Topics Concern   • Not on file   Social History Narrative   • Not on file     Social Determinants of Health     Financial Resource Strain: Not on file   Food Insecurity: Not on file   Transportation Needs: Not on file   Physical Activity: Not on file   Stress: Not on file   Social Connections: Not on file   Intimate Partner Violence: Not on file   Housing Stability: Not on file       Past Surgical History:   Procedure Laterality Date   • OTHER SURGICAL HISTORY  2021    Complete colonoscopy   • OTHER SURGICAL HISTORY  2021    Esophagogastroduodenoscopy   • SPINE SURGERY  01/15/2014    Spine Repair   • TOTAL HIP ARTHROPLASTY  2015    Hip Replacement     Xiang Leong DO [facility] Ortho PGY-2     ATTESTATION    I saw and evaluated the patient. I personally obtained the key and critical portions of the history and physical exam or was physically present for key and critical portions performed by the resident/fellow. I reviewed the resident/fellow's documentation and discussed the patient with the resident/fellow. I agree with the resident/fellow's medical decision making as documented in the note.        Electronically signed  YNES Thomas MD  [phone]

## 2023-12-20 NOTE — PROGRESS NOTES
CHIEF COMPLAINT:    Post op visit s/p revision right carpal tunnel release with scar barrier placement 11/27/2023 w Dr. gardner    ASSESSMENT + PLAN    Postop day 22 from right carpal tunnel revision release with scar barrier placement    The incision is healing normally.  Sutures are absorbable.  You may get this wet, and may advance activity as pain allows.  Work on the stretching exercises that I demonstrated. Contact my office if you would like a formal occupational therapy referral.  I reviewed the long expected duration of mild swelling and discomfort given the larger dissection necessary for these revision nerve surgeries.    Follow up with any concerns.    HISTORY OF PRESENT ILLNESS       Patient is a 78 y.o. female who presents today for her first postoperative evaluation of her right wrist s/p revision right carpal tunnel release with scar barrier placement 11/27/2023, now postop day 22. Patient says her postop course was complicated by increased erythema and swelling about the incision site as compared to her previous surgery at that site. Patient went to the emergency department on 12/6/2023 for this redness/swelling and was placed on Augmentin which she just completed yesterday. Patient endorses some pain about the incision site but otherwise denies numbness, tingling, fever, chills, drainage or any other postoperative complications or events.  Her preoperative tingling in the fingers is actually already very much improved      PHYSICAL EXAM    Constitutional:    Appears stated age. Well-developed and well-nourished .  Psychiatric:         Pleasant normal mood and affect. Behavior is appropriate for the situation.   Head:                   Normocephalic and atraumatic.  Eyes:                    Pupils are equal and round.  Cardiovascular:  2+ radial and ulnar pulses. Fingers well-perfused.  Respiratory:        Effort normal. No respiratory distress. Speaking in complete sentences.  Neurologic:        "Alert and oriented to person, place, and time.  Skin:                Skin is intact, warm and dry.  Hematologic / Lymphatic:    No lymphedema or lymphangitis.    Extremities / Musculoskeletal:                  Upper extremity:   -Midline carpal tunnel incision without dehiscence or drainage. There is mild erythema about the incision, appropriate for current stage of healing, given the scar barrier placement.   -Mild tenderness about incision without significant decrease in wrist or phalange ROM  -Fires axillary/AIN/PIN/ulnar distributions  -SILT axillary/radial/median/ulnar distributions  -Hand warm, well perfused  -Palpable radial pulse, cap refill brisk  -Compartments soft and compressible     Sensation intact to light touch in all distributions.  Capillary refill less than 2 seconds.      IMAGING / LABS / EMGs    None today      Past Medical History:   Diagnosis Date    Diabetes mellitus (CMS/HCC)     \"borderline high\"    Disorder of arteries and arterioles, unspecified (CMS/HCC)     Disorder of arteries and arterioles    Elevated blood-pressure reading, without diagnosis of hypertension     Elevated blood pressure reading without diagnosis of hypertension    GERD (gastroesophageal reflux disease)     Hyperlipidemia     Other conditions influencing health status     Disorder Of The Pelvis / Hip Joint / Femur    Other headache syndrome     Chronic mixed headache syndrome    Other intervertebral disc degeneration, lumbar region     Disc degeneration, lumbar    Palmar fascial fibromatosis (dupuytren)     Dupuytren's contracture    Personal history of nicotine dependence     History of nicotine dependence    Personal history of other diseases of the musculoskeletal system and connective tissue     History of low back pain    Personal history of other endocrine, nutritional and metabolic disease     History of diabetes mellitus    Personal history of other endocrine, nutritional and metabolic disease     History of " hyperlipidemia    Personal history of other specified conditions     History of headache    Scoliosis, unspecified     Scoliosis    Spondylosis without myelopathy or radiculopathy, lumbar region     Lumbar spondylosis    Synovitis and tenosynovitis, unspecified 08/26/2018    Tenosynovitis of finger, hand, or wrist    Unspecified condition associated with female genital organs and menstrual cycle     Genital symptoms, female       Medication Documentation Review Audit       Reviewed by Khalida Jarvis RN (Registered Nurse) on 12/06/23 at 0935      Medication Order Taking? Sig Documenting Provider Last Dose Status   ascorbic acid, vitamin C, 500 mg capsule [id number]  Take by mouth. Historical Provider, MD  Active   beta carotene (Vitamin A) 10,000 unit capsule [id number]  Take by mouth. Historical Provider, MD  Active   biotin 5 mg capsule [id number]  Take by mouth. Historical Provider, MD  Active   cholecalciferol (Vitamin D-3) 25 MCG (1000 UT) tablet [id number]  Take 1 tablet (25 mcg) by mouth once daily. Historical Provider, MD  Active   Cinnamon 500 mg capsule [id number]  Take by mouth twice a day. Historical Provider, MD  Active   diphenhydrAMINE-acetaminophen (Tylenol PM)  mg per tablet [id number]  Take 1 tablet by mouth as needed at bedtime for sleep. Historical Provider, MD  Active   escitalopram (Lexapro) 10 mg tablet [id number]  Take 1 tablet (10 mg) by mouth once daily. Historical Provider, MD  Active   flaxseed oiL 1,000 mg capsule [id number]  Take by mouth. Historical Provider, MD  Active   fluocinonide 0.05 % cream [id number]  Apply topically 2 times a day. to affected area Historical Provider, MD  Active   folic acid/vit B complex and C (B complex-vitamin C-folic acid) 400 mcg tablet extended release [id number]  Take 1 tablet by mouth once daily. Historical Provider, MD  Active   gabapentin (Neurontin) 300 mg capsule [id number]  Take 1 capsule (300 mg) by mouth 2 times a day. Frederick Atwood MD  Active    glimepiride (Amaryl) 2 mg tablet [id number]  TAKE 1/2 TABLET BY MOUTH ONCE EVERYDAY Historical Provider, MD  Active   glucosamine/chondr rangel A sod (glucosamine-chondroitin) 750-600 mg tablet tablet [id number]  Take by mouth twice a day. Historical Provider, MD  Active   HYDROcodone-acetaminophen (Norco) 5-325 mg tablet [id number]  Take 1 tablet by mouth every 4 hours if needed for severe pain (7 - 10) or moderate pain (4 - 6) for up to 4 days. Duncan Dutton MD   23 2359   omega-3 fatty acids-fish oil (Fish OiL) 340-1,000 mg capsule [id number]  Take by mouth twice a day. Historical Provider, MD  Active   RABEprazole (Aciphex) EC tablet [id number]  TAKE 1 TABLET BY MOUTH EVERY DAY Frederick Atwood MD  Active   rosuvastatin (Crestor) 10 mg tablet [id number]  TAKE 1 TABLET BY MOUTH EVERY DAY AT BEDTIME AS DIRECTED Frederick Atwood MD  Active                    Allergies   Allergen Reactions    Adhesive Tape-Silicones Unknown    Egg Unknown    Sulfa (Sulfonamide Antibiotics) Unknown     as a child, pt unaware of reaction       Social History     Socioeconomic History    Marital status:      Spouse name: Not on file    Number of children: Not on file    Years of education: Not on file    Highest education level: Not on file   Occupational History    Not on file   Tobacco Use    Smoking status: Former     Types: Cigarettes     Quit date: 2018     Years since quittin.9    Smokeless tobacco: Never   Substance and Sexual Activity    Alcohol use: Yes     Alcohol/week: 2.0 standard drinks of alcohol     Types: 2 Glasses of wine per week    Drug use: Never    Sexual activity: Not on file   Other Topics Concern    Not on file   Social History Narrative    Not on file     Social Determinants of Health     Financial Resource Strain: Not on file   Food Insecurity: Not on file   Transportation Needs: Not on file   Physical Activity: Not on file   Stress: Not on file   Social Connections: Not on file    Intimate Partner Violence: Not on file   Housing Stability: Not on file       Past Surgical History:   Procedure Laterality Date    OTHER SURGICAL HISTORY  01/18/2021    Complete colonoscopy    OTHER SURGICAL HISTORY  01/18/2021    Esophagogastroduodenoscopy    SPINE SURGERY  01/15/2014    Spine Repair    TOTAL HIP ARTHROPLASTY  12/07/2015    Hip Replacement     Xiang Leong DO [facility] Ortho PGY-2     ATTESTATION    I saw and evaluated the patient. I personally obtained the key and critical portions of the history and physical exam or was physically present for key and critical portions performed by the resident/fellow. I reviewed the resident/fellow's documentation and discussed the patient with the resident/fellow. I agree with the resident/fellow's medical decision making as documented in the note.        Electronically signed  YNES Thomas MD  [phone]

## 2024-01-21 DIAGNOSIS — E11.9 TYPE 2 DIABETES MELLITUS WITHOUT COMPLICATIONS (MULTI): ICD-10-CM

## 2024-01-21 DIAGNOSIS — Z00.00 ENCOUNTER FOR GENERAL ADULT MEDICAL EXAMINATION WITHOUT ABNORMAL FINDINGS: ICD-10-CM

## 2024-01-23 RX ORDER — ESCITALOPRAM OXALATE 10 MG/1
10 TABLET ORAL DAILY
Qty: 90 TABLET | Refills: 2 | Status: SHIPPED | OUTPATIENT
Start: 2024-01-23

## 2024-01-23 RX ORDER — GLIMEPIRIDE 2 MG/1
TABLET ORAL
Qty: 45 TABLET | Refills: 1 | Status: SHIPPED | OUTPATIENT
Start: 2024-01-23

## 2024-01-23 NOTE — TELEPHONE ENCOUNTER
Patient said  she need gabapentin refilled because she will be going out of town, the pharmacy say its too soon to fill  unless you call stating its ok because its a controlled subtance

## 2024-01-26 DIAGNOSIS — M48.061 SPINAL STENOSIS OF LUMBAR REGION, UNSPECIFIED WHETHER NEUROGENIC CLAUDICATION PRESENT: ICD-10-CM

## 2024-01-26 RX ORDER — GABAPENTIN 300 MG/1
300 CAPSULE ORAL 2 TIMES DAILY
Qty: 60 CAPSULE | Refills: 0 | Status: SHIPPED | OUTPATIENT
Start: 2024-01-26 | End: 2024-03-29

## 2024-03-28 DIAGNOSIS — M48.061 SPINAL STENOSIS OF LUMBAR REGION, UNSPECIFIED WHETHER NEUROGENIC CLAUDICATION PRESENT: ICD-10-CM

## 2024-03-29 RX ORDER — GABAPENTIN 300 MG/1
300 CAPSULE ORAL 2 TIMES DAILY
Qty: 60 CAPSULE | Refills: 0 | Status: SHIPPED | OUTPATIENT
Start: 2024-03-29 | End: 2024-04-26

## 2024-04-19 DIAGNOSIS — R13.10 DYSPHAGIA: ICD-10-CM

## 2024-04-19 RX ORDER — ROSUVASTATIN CALCIUM 10 MG/1
TABLET, COATED ORAL
Qty: 90 TABLET | Refills: 1 | Status: SHIPPED | OUTPATIENT
Start: 2024-04-19

## 2024-04-19 RX ORDER — RABEPRAZOLE SODIUM 20 MG/1
20 TABLET, DELAYED RELEASE ORAL DAILY
Qty: 90 TABLET | Refills: 1 | Status: SHIPPED | OUTPATIENT
Start: 2024-04-19

## 2024-04-24 DIAGNOSIS — M48.061 SPINAL STENOSIS OF LUMBAR REGION, UNSPECIFIED WHETHER NEUROGENIC CLAUDICATION PRESENT: ICD-10-CM

## 2024-04-26 RX ORDER — GABAPENTIN 300 MG/1
300 CAPSULE ORAL 2 TIMES DAILY
Qty: 60 CAPSULE | Refills: 0 | Status: SHIPPED | OUTPATIENT
Start: 2024-04-26

## 2024-05-21 ENCOUNTER — TELEPHONE (OUTPATIENT)
Dept: PRIMARY CARE | Facility: CLINIC | Age: 79
End: 2024-05-21

## 2024-05-21 DIAGNOSIS — Z12.31 VISIT FOR SCREENING MAMMOGRAM: Primary | ICD-10-CM

## 2024-06-10 ENCOUNTER — OFFICE VISIT (OUTPATIENT)
Dept: PRIMARY CARE | Facility: CLINIC | Age: 79
End: 2024-06-10
Payer: MEDICARE

## 2024-06-10 ENCOUNTER — LAB (OUTPATIENT)
Dept: LAB | Facility: LAB | Age: 79
End: 2024-06-10
Payer: MEDICARE

## 2024-06-10 VITALS — DIASTOLIC BLOOD PRESSURE: 77 MMHG | SYSTOLIC BLOOD PRESSURE: 129 MMHG | BODY MASS INDEX: 27.62 KG/M2 | WEIGHT: 151 LBS

## 2024-06-10 DIAGNOSIS — E78.2 MIXED HYPERLIPIDEMIA: ICD-10-CM

## 2024-06-10 DIAGNOSIS — I10 PRIMARY HYPERTENSION: ICD-10-CM

## 2024-06-10 DIAGNOSIS — Z00.00 HEALTH CARE MAINTENANCE: ICD-10-CM

## 2024-06-10 DIAGNOSIS — E11.9 TYPE 2 DIABETES MELLITUS WITHOUT COMPLICATION, UNSPECIFIED WHETHER LONG TERM INSULIN USE (MULTI): ICD-10-CM

## 2024-06-10 DIAGNOSIS — M85.80 OSTEOPENIA, UNSPECIFIED LOCATION: Primary | ICD-10-CM

## 2024-06-10 DIAGNOSIS — M81.8 OTHER OSTEOPOROSIS WITHOUT CURRENT PATHOLOGICAL FRACTURE: ICD-10-CM

## 2024-06-10 DIAGNOSIS — M48.061 SPINAL STENOSIS OF LUMBAR REGION, UNSPECIFIED WHETHER NEUROGENIC CLAUDICATION PRESENT: ICD-10-CM

## 2024-06-10 LAB
ALBUMIN SERPL BCP-MCNC: 4.4 G/DL (ref 3.4–5)
ALP SERPL-CCNC: 148 U/L (ref 33–136)
ALT SERPL W P-5'-P-CCNC: 44 U/L (ref 7–45)
ANION GAP SERPL CALC-SCNC: 14 MMOL/L (ref 10–20)
AST SERPL W P-5'-P-CCNC: 39 U/L (ref 9–39)
BILIRUB SERPL-MCNC: 0.3 MG/DL (ref 0–1.2)
BUN SERPL-MCNC: 13 MG/DL (ref 6–23)
CALCIUM SERPL-MCNC: 9.4 MG/DL (ref 8.6–10.6)
CHLORIDE SERPL-SCNC: 103 MMOL/L (ref 98–107)
CHOLEST SERPL-MCNC: 143 MG/DL (ref 0–199)
CHOLESTEROL/HDL RATIO: 3.7
CO2 SERPL-SCNC: 29 MMOL/L (ref 21–32)
CREAT SERPL-MCNC: 1 MG/DL (ref 0.5–1.05)
EGFRCR SERPLBLD CKD-EPI 2021: 59 ML/MIN/1.73M*2
ERYTHROCYTE [DISTWIDTH] IN BLOOD BY AUTOMATED COUNT: 13.2 % (ref 11.5–14.5)
EST. AVERAGE GLUCOSE BLD GHB EST-MCNC: 140 MG/DL
GLUCOSE SERPL-MCNC: 84 MG/DL (ref 74–99)
HBA1C MFR BLD: 6.5 %
HCT VFR BLD AUTO: 42 % (ref 36–46)
HDLC SERPL-MCNC: 39.1 MG/DL
HGB BLD-MCNC: 13.6 G/DL (ref 12–16)
LDLC SERPL CALC-MCNC: 54 MG/DL
MCH RBC QN AUTO: 30.8 PG (ref 26–34)
MCHC RBC AUTO-ENTMCNC: 32.4 G/DL (ref 32–36)
MCV RBC AUTO: 95 FL (ref 80–100)
NON HDL CHOLESTEROL: 104 MG/DL (ref 0–149)
NRBC BLD-RTO: 0 /100 WBCS (ref 0–0)
PLATELET # BLD AUTO: 332 X10*3/UL (ref 150–450)
POTASSIUM SERPL-SCNC: 4.7 MMOL/L (ref 3.5–5.3)
PROT SERPL-MCNC: 7 G/DL (ref 6.4–8.2)
RBC # BLD AUTO: 4.41 X10*6/UL (ref 4–5.2)
SODIUM SERPL-SCNC: 141 MMOL/L (ref 136–145)
TRIGL SERPL-MCNC: 249 MG/DL (ref 0–149)
VLDL: 50 MG/DL (ref 0–40)
WBC # BLD AUTO: 6.6 X10*3/UL (ref 4.4–11.3)

## 2024-06-10 PROCEDURE — 80061 LIPID PANEL: CPT

## 2024-06-10 PROCEDURE — 85027 COMPLETE CBC AUTOMATED: CPT

## 2024-06-10 PROCEDURE — 83036 HEMOGLOBIN GLYCOSYLATED A1C: CPT

## 2024-06-10 PROCEDURE — 80053 COMPREHEN METABOLIC PANEL: CPT

## 2024-06-10 PROCEDURE — 99214 OFFICE O/P EST MOD 30 MIN: CPT | Performed by: INTERNAL MEDICINE

## 2024-06-10 PROCEDURE — 3074F SYST BP LT 130 MM HG: CPT | Performed by: INTERNAL MEDICINE

## 2024-06-10 PROCEDURE — 3078F DIAST BP <80 MM HG: CPT | Performed by: INTERNAL MEDICINE

## 2024-06-10 PROCEDURE — 36415 COLL VENOUS BLD VENIPUNCTURE: CPT

## 2024-06-10 NOTE — PROGRESS NOTES
Subjective   Patient ID: Alida Drew is a 78 y.o. female who presents for No chief complaint on file..    HPI follow-up visit no chest pain no shortness of breath no polyuria polydipsia inquired about her bone density and her urinary aneurysm scan has her mammogram already scheduled no hypoglycemic symptoms doing gabapentin without significant issue but having good relief will be going away in the fall for an extended vacation may need medication override at that time    Review of Systems    Vital signs noted alert and oriented x 3 NCAT no JVD or bruit chest clear to auscultation CV regular rate and rhythm S1-S2 without murmur gallop or rub abdomen soft nontender normal active bowel sounds LS spine normal curvature negative straight leg raise extremities no clubbing cyanosis or edema normal distal pulses DTR 2+ Objective   There were no vitals taken for this visit.    Physical Exam    Assessment/Plan     Impression diabetes mellitus type 2?  Low back pain hypertension hyperlipidemia osteoporosis/penia  Plan check glycohemoglobin advised on long-term blood sugar test check Chem-7 advised on glucose potassium and kidney function advised on blood pressure blood pressure medicine is needed check hepatic panel lipid panel advised on liver enzymes as well as cholesterol cholesterol medicine  Set up DEXA scan (check)  Already scheduled for mammogram  Schedule for aortic aneurysm scanning if needed last CT scan 2020 negative may recheck at next visit  Recheck prescriptions when needed and refilled and advised on urine medication screening test after review as well as OARRS report monitor blood sugars at home back hygiene back stretches follow-up 3 to 6 months based on above TT 50 cc 26

## 2024-06-17 ENCOUNTER — TELEPHONE (OUTPATIENT)
Dept: PRIMARY CARE | Facility: CLINIC | Age: 79
End: 2024-06-17

## 2024-06-21 ENCOUNTER — HOSPITAL ENCOUNTER (OUTPATIENT)
Dept: RADIOLOGY | Facility: CLINIC | Age: 79
Discharge: HOME | End: 2024-06-21
Payer: MEDICARE

## 2024-06-21 ENCOUNTER — APPOINTMENT (OUTPATIENT)
Dept: RADIOLOGY | Facility: CLINIC | Age: 79
End: 2024-06-21
Payer: MEDICARE

## 2024-06-21 VITALS — HEIGHT: 61 IN | WEIGHT: 150 LBS | BODY MASS INDEX: 28.32 KG/M2

## 2024-06-21 DIAGNOSIS — M81.8 OTHER OSTEOPOROSIS WITHOUT CURRENT PATHOLOGICAL FRACTURE: ICD-10-CM

## 2024-06-21 DIAGNOSIS — M85.80 OSTEOPENIA, UNSPECIFIED LOCATION: ICD-10-CM

## 2024-06-21 DIAGNOSIS — Z12.31 VISIT FOR SCREENING MAMMOGRAM: ICD-10-CM

## 2024-06-21 PROCEDURE — 77067 SCR MAMMO BI INCL CAD: CPT

## 2024-06-21 PROCEDURE — 77080 DXA BONE DENSITY AXIAL: CPT

## 2024-06-25 NOTE — TELEPHONE ENCOUNTER
Pt called again to get status of referral for US Aortic aneurysm/bilateral.    Provider to advise    Per provider's notes from last visit:  Schedule for aortic aneurysm scanning

## 2024-06-30 DIAGNOSIS — I10 PRIMARY HYPERTENSION: Primary | ICD-10-CM

## 2024-06-30 DIAGNOSIS — E78.2 MIXED HYPERLIPIDEMIA: ICD-10-CM

## 2024-07-23 DIAGNOSIS — E11.9 TYPE 2 DIABETES MELLITUS WITHOUT COMPLICATIONS (MULTI): ICD-10-CM

## 2024-07-24 DIAGNOSIS — M48.061 SPINAL STENOSIS OF LUMBAR REGION, UNSPECIFIED WHETHER NEUROGENIC CLAUDICATION PRESENT: ICD-10-CM

## 2024-07-24 RX ORDER — GABAPENTIN 300 MG/1
300 CAPSULE ORAL 2 TIMES DAILY
Qty: 60 CAPSULE | Refills: 0 | Status: SHIPPED | OUTPATIENT
Start: 2024-07-24

## 2024-07-25 RX ORDER — GLIMEPIRIDE 2 MG/1
TABLET ORAL
Qty: 45 TABLET | Refills: 1 | Status: SHIPPED | OUTPATIENT
Start: 2024-07-25

## 2024-07-26 ENCOUNTER — TELEPHONE (OUTPATIENT)
Dept: PRIMARY CARE | Facility: CLINIC | Age: 79
End: 2024-07-26

## 2024-07-26 ENCOUNTER — HOSPITAL ENCOUNTER (OUTPATIENT)
Dept: VASCULAR MEDICINE | Facility: CLINIC | Age: 79
Discharge: HOME | End: 2024-07-26
Payer: MEDICARE

## 2024-07-26 DIAGNOSIS — Z13.6 ENCOUNTER FOR SCREENING FOR CARDIOVASCULAR DISORDERS: ICD-10-CM

## 2024-07-26 DIAGNOSIS — E78.2 MIXED HYPERLIPIDEMIA: ICD-10-CM

## 2024-07-26 DIAGNOSIS — I10 PRIMARY HYPERTENSION: ICD-10-CM

## 2024-07-26 PROCEDURE — 76706 US ABDL AORTA SCREEN AAA: CPT | Performed by: INTERNAL MEDICINE

## 2024-07-26 PROCEDURE — 76706 US ABDL AORTA SCREEN AAA: CPT

## 2024-08-12 ENCOUNTER — TELEPHONE (OUTPATIENT)
Dept: PRIMARY CARE | Facility: CLINIC | Age: 79
End: 2024-08-12

## 2024-08-22 DIAGNOSIS — M48.061 SPINAL STENOSIS OF LUMBAR REGION, UNSPECIFIED WHETHER NEUROGENIC CLAUDICATION PRESENT: ICD-10-CM

## 2024-08-23 DIAGNOSIS — I71.21 ANEURYSM OF ASCENDING AORTA WITHOUT RUPTURE (CMS-HCC): Primary | ICD-10-CM

## 2024-08-24 RX ORDER — GABAPENTIN 300 MG/1
300 CAPSULE ORAL 2 TIMES DAILY
Qty: 60 CAPSULE | Refills: 0 | Status: SHIPPED | OUTPATIENT
Start: 2024-08-24

## 2024-09-24 ENCOUNTER — HOSPITAL ENCOUNTER (OUTPATIENT)
Dept: CARDIOLOGY | Facility: CLINIC | Age: 79
Discharge: HOME | End: 2024-09-24
Payer: MEDICARE

## 2024-09-24 VITALS
WEIGHT: 150 LBS | DIASTOLIC BLOOD PRESSURE: 78 MMHG | SYSTOLIC BLOOD PRESSURE: 130 MMHG | HEIGHT: 61 IN | BODY MASS INDEX: 28.32 KG/M2

## 2024-09-24 DIAGNOSIS — I71.21 ANEURYSM OF ASCENDING AORTA WITHOUT RUPTURE (CMS-HCC): ICD-10-CM

## 2024-09-24 LAB
AORTIC VALVE MEAN GRADIENT: 6 MMHG
AORTIC VALVE PEAK VELOCITY: 1.62 M/S
AV PEAK GRADIENT: 10.5 MMHG
AVA (PEAK VEL): 1.63 CM2
AVA (VTI): 1.82 CM2
EJECTION FRACTION APICAL 4 CHAMBER: 62.1
EJECTION FRACTION: 63 %
LEFT VENTRICLE INTERNAL DIMENSION DIASTOLE: 4.78 CM (ref 3.5–6)
LEFT VENTRICULAR OUTFLOW TRACT DIAMETER: 1.8 CM
LV EJECTION FRACTION BIPLANE: 63 %
MITRAL VALVE E/A RATIO: 0.82
MITRAL VALVE E/E' RATIO: 12.2
RIGHT VENTRICLE PEAK SYSTOLIC PRESSURE: 23.6 MMHG

## 2024-09-24 PROCEDURE — 93306 TTE W/DOPPLER COMPLETE: CPT

## 2024-09-24 PROCEDURE — 93306 TTE W/DOPPLER COMPLETE: CPT | Performed by: INTERNAL MEDICINE

## 2024-10-07 ENCOUNTER — OFFICE VISIT (OUTPATIENT)
Dept: URGENT CARE | Age: 79
End: 2024-10-07
Payer: MEDICARE

## 2024-10-07 VITALS
SYSTOLIC BLOOD PRESSURE: 123 MMHG | WEIGHT: 150 LBS | OXYGEN SATURATION: 98 % | HEIGHT: 61 IN | TEMPERATURE: 98.4 F | BODY MASS INDEX: 28.32 KG/M2 | HEART RATE: 77 BPM | DIASTOLIC BLOOD PRESSURE: 67 MMHG | RESPIRATION RATE: 16 BRPM

## 2024-10-07 DIAGNOSIS — J01.90 ACUTE SINUSITIS, RECURRENCE NOT SPECIFIED, UNSPECIFIED LOCATION: Primary | ICD-10-CM

## 2024-10-07 RX ORDER — AMOXICILLIN 500 MG/1
500 CAPSULE ORAL EVERY 8 HOURS SCHEDULED
Qty: 21 CAPSULE | Refills: 0 | Status: SHIPPED | OUTPATIENT
Start: 2024-10-07 | End: 2024-10-14

## 2024-10-07 ASSESSMENT — ENCOUNTER SYMPTOMS
COUGH: 1
SINUS PAIN: 1
CHILLS: 0
FEVER: 0
SORE THROAT: 0
SINUS PRESSURE: 1
FATIGUE: 1
SHORTNESS OF BREATH: 0
WHEEZING: 0
SINUS COMPLAINT: 1

## 2024-10-07 ASSESSMENT — PAIN SCALES - GENERAL: PAINLEVEL: 0-NO PAIN

## 2024-10-07 NOTE — PROGRESS NOTES
"Subjective   Patient ID: Alida Drew is a 79 y.o. female. They present today with a chief complaint of Sinus Problem (Cough, runny nose for 2.5 weeks).    History of Present Illness    Sinus Problem  Associated symptoms: congestion, cough and fatigue    Associated symptoms: no chest pain, no ear pain, no fever, no shortness of breath, no sore throat and no wheezing        Patient reports symptoms for 2.5 weeks. She has been taking mucinex, delsym, and sudafed. Reports no improvement of symptoms.     Past Medical History  Allergies as of 10/07/2024 - Reviewed 10/07/2024   Allergen Reaction Noted   • Adhesive tape-silicones Unknown 09/25/2023   • Egg Unknown 04/26/2022   • Sulfa (sulfonamide antibiotics) Unknown 09/25/2023       (Not in a hospital admission)       Past Medical History:   Diagnosis Date   • Diabetes mellitus (Multi)     \"borderline high\"   • Disorder of arteries and arterioles, unspecified     Disorder of arteries and arterioles   • Elevated blood-pressure reading, without diagnosis of hypertension     Elevated blood pressure reading without diagnosis of hypertension   • GERD (gastroesophageal reflux disease)    • Hyperlipidemia    • Other conditions influencing health status     Disorder Of The Pelvis / Hip Joint / Femur   • Other headache syndrome     Chronic mixed headache syndrome   • Other intervertebral disc degeneration, lumbar region     Disc degeneration, lumbar   • Palmar fascial fibromatosis (dupuytren)     Dupuytren's contracture   • Personal history of nicotine dependence     History of nicotine dependence   • Personal history of other diseases of the musculoskeletal system and connective tissue     History of low back pain   • Personal history of other endocrine, nutritional and metabolic disease     History of diabetes mellitus   • Personal history of other endocrine, nutritional and metabolic disease     History of hyperlipidemia   • Personal history of other specified conditions     " "History of headache   • Scoliosis, unspecified     Scoliosis   • Spondylosis without myelopathy or radiculopathy, lumbar region     Lumbar spondylosis   • Synovitis and tenosynovitis, unspecified 08/26/2018    Tenosynovitis of finger, hand, or wrist   • Unspecified condition associated with female genital organs and menstrual cycle     Genital symptoms, female       Past Surgical History:   Procedure Laterality Date   • OTHER SURGICAL HISTORY  01/18/2021    Complete colonoscopy   • OTHER SURGICAL HISTORY  01/18/2021    Esophagogastroduodenoscopy   • SPINE SURGERY  01/15/2014    Spine Repair   • TOTAL HIP ARTHROPLASTY  12/07/2015    Hip Replacement        reports that she quit smoking about 6 years ago. Her smoking use included cigarettes. She has never used smokeless tobacco. She reports current alcohol use of about 2.0 standard drinks of alcohol per week. She reports that she does not use drugs.    Review of Systems  Review of Systems   Constitutional:  Positive for fatigue. Negative for chills and fever.   HENT:  Positive for congestion, postnasal drip, sinus pressure and sinus pain. Negative for ear pain and sore throat.    Respiratory:  Positive for cough. Negative for shortness of breath and wheezing.    Cardiovascular:  Negative for chest pain.                                  Objective    Vitals:    10/07/24 1021   BP: 123/67   BP Location: Left arm   Patient Position: Sitting   BP Cuff Size: Adult   Pulse: 77   Resp: 16   Temp: 36.9 °C (98.4 °F)   TempSrc: Oral   SpO2: 98%   Weight: 68 kg (150 lb)   Height: 1.549 m (5' 1\")     No LMP recorded. Patient is postmenopausal.    Physical Exam  Constitutional:       Appearance: Normal appearance.   HENT:      Head: Normocephalic.      Right Ear: Tympanic membrane, ear canal and external ear normal.      Left Ear: Tympanic membrane, ear canal and external ear normal.      Nose: Congestion present.      Mouth/Throat:      Mouth: Mucous membranes are moist.      " Pharynx: Postnasal drip present.   Eyes:      Conjunctiva/sclera: Conjunctivae normal.   Cardiovascular:      Rate and Rhythm: Normal rate and regular rhythm.      Heart sounds: Normal heart sounds.   Pulmonary:      Effort: Pulmonary effort is normal.      Breath sounds: Normal breath sounds and air entry.   Skin:     General: Skin is warm and dry.   Neurological:      Mental Status: She is alert.       Procedures    Point of Care Test & Imaging Results from this visit  No results found for this visit on 10/07/24.   No results found.    Diagnostic study results (if any) were reviewed by AMARJIT Wong.    Assessment/Plan   Allergies, medications, history, and pertinent labs/EKGs/Imaging reviewed by AMARJIT Wong.     Medical Decision Making  Will start patient on amoxicillin for acute sinusitisis. Patient was told to continue OTC medication if needed. If symptoms are not improving in the next 3-5 days then follow up with PCP or return to clinic if needed.     Orders and Diagnoses  Diagnoses and all orders for this visit:  Acute sinusitis, recurrence not specified, unspecified location  -     amoxicillin (Amoxil) 500 mg capsule; Take 1 capsule (500 mg) by mouth every 8 hours for 7 days.      Medical Admin Record      Patient disposition: Home    Electronically signed by AMARJIT Wong  10:46 AM

## 2024-10-18 DIAGNOSIS — R13.10 DYSPHAGIA: ICD-10-CM

## 2024-10-18 DIAGNOSIS — Z00.00 ENCOUNTER FOR GENERAL ADULT MEDICAL EXAMINATION WITHOUT ABNORMAL FINDINGS: ICD-10-CM

## 2024-10-19 RX ORDER — RABEPRAZOLE SODIUM 20 MG/1
20 TABLET, DELAYED RELEASE ORAL DAILY
Qty: 90 TABLET | Refills: 1 | Status: SHIPPED | OUTPATIENT
Start: 2024-10-19

## 2024-10-19 RX ORDER — ESCITALOPRAM OXALATE 10 MG/1
10 TABLET ORAL DAILY
Qty: 90 TABLET | Refills: 2 | Status: SHIPPED | OUTPATIENT
Start: 2024-10-19

## 2024-10-19 RX ORDER — ROSUVASTATIN CALCIUM 10 MG/1
TABLET, COATED ORAL
Qty: 90 TABLET | Refills: 1 | Status: SHIPPED | OUTPATIENT
Start: 2024-10-19

## 2024-11-06 DIAGNOSIS — M48.061 SPINAL STENOSIS OF LUMBAR REGION, UNSPECIFIED WHETHER NEUROGENIC CLAUDICATION PRESENT: ICD-10-CM

## 2024-11-07 RX ORDER — GABAPENTIN 300 MG/1
300 CAPSULE ORAL 2 TIMES DAILY
Qty: 60 CAPSULE | Refills: 0 | Status: SHIPPED | OUTPATIENT
Start: 2024-11-07

## 2024-12-16 ENCOUNTER — APPOINTMENT (OUTPATIENT)
Dept: PRIMARY CARE | Facility: CLINIC | Age: 79
End: 2024-12-16
Payer: MEDICARE

## 2024-12-16 ENCOUNTER — LAB (OUTPATIENT)
Dept: LAB | Facility: LAB | Age: 79
End: 2024-12-16
Payer: MEDICARE

## 2024-12-16 VITALS — SYSTOLIC BLOOD PRESSURE: 136 MMHG | WEIGHT: 150 LBS | BODY MASS INDEX: 28.34 KG/M2 | DIASTOLIC BLOOD PRESSURE: 78 MMHG

## 2024-12-16 DIAGNOSIS — M48.061 SPINAL STENOSIS OF LUMBAR REGION, UNSPECIFIED WHETHER NEUROGENIC CLAUDICATION PRESENT: ICD-10-CM

## 2024-12-16 DIAGNOSIS — E11.9 TYPE 2 DIABETES MELLITUS WITHOUT COMPLICATION, UNSPECIFIED WHETHER LONG TERM INSULIN USE (MULTI): ICD-10-CM

## 2024-12-16 DIAGNOSIS — E78.2 MIXED HYPERLIPIDEMIA: ICD-10-CM

## 2024-12-16 DIAGNOSIS — Z00.00 HEALTH CARE MAINTENANCE: Primary | ICD-10-CM

## 2024-12-16 DIAGNOSIS — I10 PRIMARY HYPERTENSION: ICD-10-CM

## 2024-12-16 LAB
EST. AVERAGE GLUCOSE BLD GHB EST-MCNC: 131 MG/DL
HBA1C MFR BLD: 6.2 %

## 2024-12-16 PROCEDURE — 1160F RVW MEDS BY RX/DR IN RCRD: CPT | Performed by: INTERNAL MEDICINE

## 2024-12-16 PROCEDURE — 3075F SYST BP GE 130 - 139MM HG: CPT | Performed by: INTERNAL MEDICINE

## 2024-12-16 PROCEDURE — 1159F MED LIST DOCD IN RCRD: CPT | Performed by: INTERNAL MEDICINE

## 2024-12-16 PROCEDURE — 3078F DIAST BP <80 MM HG: CPT | Performed by: INTERNAL MEDICINE

## 2024-12-16 PROCEDURE — 36415 COLL VENOUS BLD VENIPUNCTURE: CPT

## 2024-12-16 PROCEDURE — 99213 OFFICE O/P EST LOW 20 MIN: CPT | Performed by: INTERNAL MEDICINE

## 2024-12-16 PROCEDURE — 83036 HEMOGLOBIN GLYCOSYLATED A1C: CPT

## 2024-12-16 PROCEDURE — 1170F FXNL STATUS ASSESSED: CPT | Performed by: INTERNAL MEDICINE

## 2024-12-16 PROCEDURE — G0439 PPPS, SUBSEQ VISIT: HCPCS | Performed by: INTERNAL MEDICINE

## 2024-12-16 PROCEDURE — 1036F TOBACCO NON-USER: CPT | Performed by: INTERNAL MEDICINE

## 2024-12-16 NOTE — PROGRESS NOTES
Subjective   Patient ID: Alida Drew is a 79 y.o. female who presents for No chief complaint on file..    HPI CPE see updated front sheet no chest pain no shortness of breath no polyuria polydipsia no hypoglycemic episodes has slightly increased her Neurontin dosing if she waits her pain medicine appointment because of the low back pain with radiation towards the right groin bowels okay no dysuria    Past medical history spinal stenosis diabetes mellitus    Medications noted and unchanged    Allergies no known drug allergies    Social history no tobacco alcohol wine with dinner    Family history Brother colon cancer    Prevention had colonoscopy in the past with Dr. Balbuena had hand surgery in the past with Dr. Flores some exercise some prior blood work reviewed discussed with vaccinations    Depression screen not depressed    Review of Systems    Objective   There were no vitals taken for this visit.    Physical Exam vital signs noted alert and oriented x3 NCAT PERRLA EOMI nares without discharge OP benign TM normal bilateral EAC clear bilateral no AC nodes no JVD or bruit no thyromegaly chest clear to auscultation and percussion CV regular rate and rhythm S1-S2 without murmur gallop or rub abdomen soft nontender normal active bowel sounds no rebound or guarding no HSM LS spine normal curvature negative straight leg raise negative logroll negative SI joint tenderness extremities no clubbing cyanosis or edema normal distal pulses DTR 2+           impression General medical examination  lumbar spinal stenosis diabetes mellitus  Hypertension Hyperlipidemia other diagnoses  Assessment/Plan    plan review of prior laboratory results with her review prior imaging and screening with her check glycohemoglobin advised on long-term blood sugar test advised on medications good diet regular exercise good water consumption follow-up with pain management continue with the gabapentin twice daily for now other medications up to  date follow-up for next mammogram may no longer be having colonoscopy recheck 3 months based on labs we will continue with other medications TT 50 cc 26

## 2024-12-30 ENCOUNTER — APPOINTMENT (OUTPATIENT)
Facility: CLINIC | Age: 79
End: 2024-12-30
Payer: MEDICARE

## 2025-01-05 ASSESSMENT — PATIENT HEALTH QUESTIONNAIRE - PHQ9
1. LITTLE INTEREST OR PLEASURE IN DOING THINGS: NOT AT ALL
2. FEELING DOWN, DEPRESSED OR HOPELESS: NOT AT ALL
SUM OF ALL RESPONSES TO PHQ9 QUESTIONS 1 AND 2: 0

## 2025-01-05 ASSESSMENT — ENCOUNTER SYMPTOMS
DEPRESSION: 0
OCCASIONAL FEELINGS OF UNSTEADINESS: 0
LOSS OF SENSATION IN FEET: 0

## 2025-01-05 ASSESSMENT — ACTIVITIES OF DAILY LIVING (ADL)
TAKING_MEDICATION: INDEPENDENT
DOING_HOUSEWORK: INDEPENDENT
GROCERY_SHOPPING: INDEPENDENT
DRESSING: INDEPENDENT
BATHING: INDEPENDENT
MANAGING_FINANCES: INDEPENDENT

## 2025-01-10 DIAGNOSIS — M48.061 SPINAL STENOSIS OF LUMBAR REGION, UNSPECIFIED WHETHER NEUROGENIC CLAUDICATION PRESENT: ICD-10-CM

## 2025-01-10 RX ORDER — GABAPENTIN 300 MG/1
300 CAPSULE ORAL 2 TIMES DAILY
Qty: 60 CAPSULE | Refills: 0 | Status: SHIPPED | OUTPATIENT
Start: 2025-01-10

## 2025-01-14 DIAGNOSIS — E11.9 TYPE 2 DIABETES MELLITUS WITHOUT COMPLICATIONS (MULTI): ICD-10-CM

## 2025-01-15 RX ORDER — GLIMEPIRIDE 2 MG/1
2 TABLET ORAL
Qty: 90 TABLET | Refills: 1 | Status: SHIPPED | OUTPATIENT
Start: 2025-01-15

## 2025-01-15 RX ORDER — GLIMEPIRIDE 2 MG/1
TABLET ORAL
Qty: 45 TABLET | Refills: 1 | Status: SHIPPED | OUTPATIENT
Start: 2025-01-15

## 2025-01-28 ENCOUNTER — APPOINTMENT (OUTPATIENT)
Facility: CLINIC | Age: 80
End: 2025-01-28
Payer: MEDICARE

## 2025-01-28 ENCOUNTER — HOSPITAL ENCOUNTER (OUTPATIENT)
Dept: RADIOLOGY | Facility: CLINIC | Age: 80
Discharge: HOME | End: 2025-01-28
Payer: MEDICARE

## 2025-01-28 VITALS
BODY MASS INDEX: 27.94 KG/M2 | WEIGHT: 148 LBS | SYSTOLIC BLOOD PRESSURE: 150 MMHG | DIASTOLIC BLOOD PRESSURE: 68 MMHG | HEART RATE: 90 BPM | HEIGHT: 61 IN | RESPIRATION RATE: 17 BRPM

## 2025-01-28 DIAGNOSIS — M25.552 BILATERAL HIP PAIN: ICD-10-CM

## 2025-01-28 DIAGNOSIS — M25.551 BILATERAL HIP PAIN: ICD-10-CM

## 2025-01-28 PROCEDURE — 99214 OFFICE O/P EST MOD 30 MIN: CPT | Performed by: PAIN MEDICINE

## 2025-01-28 PROCEDURE — 73523 X-RAY EXAM HIPS BI 5/> VIEWS: CPT

## 2025-01-28 PROCEDURE — 1159F MED LIST DOCD IN RCRD: CPT | Performed by: PAIN MEDICINE

## 2025-01-28 PROCEDURE — 73523 X-RAY EXAM HIPS BI 5/> VIEWS: CPT | Mod: BILATERAL PROCEDURE | Performed by: RADIOLOGY

## 2025-01-28 ASSESSMENT — PAIN DESCRIPTION - DESCRIPTORS: DESCRIPTORS: DULL;ACHING

## 2025-01-28 ASSESSMENT — PAIN SCALES - GENERAL
PAINLEVEL_OUTOF10: 5
PAINLEVEL_OUTOF10: 5 - MODERATE PAIN

## 2025-01-28 ASSESSMENT — PAIN - FUNCTIONAL ASSESSMENT: PAIN_FUNCTIONAL_ASSESSMENT: 0-10

## 2025-01-28 NOTE — PROGRESS NOTES
Subjective   Patient ID: Alida Drew is a 79 y.o. female with a past medical history of chronic low back pain, total hip replacements.  Patient states that her pain is mostly in the right lower back, radiating anteriorly into the right groin.  Pain is worse with prolonged standing, and sometimes with prolonged walking if done briskly.  She does not really have pain with sitting.  Patient states that she takes gabapentin 300 mg nightly for her chronic pain syndrome in her neck and lower back, and this helps her back pain some.  She states that she has started to use it during the day, which also produces pain relief lasting 7 to 8 hours. The pain causes significant stress in the patient's life, interfering with general activity, mood, ability to walk distances, ability to perform tasks at home and/or work. Patient participates in physical therapy, and continues to perform physician directed exercises at home. Patient denies any bowel or bladder incontinence, saddle anesthesia, weaknesses, or falls.      Review of Systems   13-point ROS done and negative except for HPI.     Current Outpatient Medications   Medication Instructions    ascorbic acid, vitamin C, 500 mg capsule oral    beta carotene (Vitamin A) 10,000 unit capsule oral    biotin 5 mg capsule oral    cholecalciferol (Vitamin D-3) 25 MCG (1000 UT) tablet 1 tablet, oral, Daily    Cinnamon 500 mg capsule oral, 2 times daily    diphenhydrAMINE-acetaminophen (Tylenol PM)  mg per tablet 1 tablet, oral, Nightly PRN    escitalopram (LEXAPRO) 10 mg, oral, Daily    flaxseed oiL 1,000 mg capsule oral    fluocinonide 0.05 % cream Topical, 2 times daily, to affected area    folic acid/vit B complex and C (B complex-vitamin C-folic acid) 400 mcg tablet extended release 1 tablet, oral, Daily    gabapentin (NEURONTIN) 300 mg, oral, 2 times daily    glimepiride (Amaryl) 2 mg tablet TAKE 1/2 TABLET BY MOUTH ONCE EVERYDAY    glimepiride (AMARYL) 2 mg, oral, Daily  "before breakfast    glucosamine/chondr rangel A sod (glucosamine-chondroitin) 750-600 mg tablet tablet oral, 2 times daily    omega-3 fatty acids-fish oil (Fish OiL) 340-1,000 mg capsule oral, 2 times daily    RABEprazole (ACIPHEX) 20 mg, oral, Daily    rosuvastatin (Crestor) 10 mg tablet TAKE 1 TABLET BY MOUTH EVERY DAY AT BEDTIME AS DIRECTED       Past Medical History:   Diagnosis Date    Diabetes mellitus (Multi)     \"borderline high\"    Disorder of arteries and arterioles, unspecified     Disorder of arteries and arterioles    Elevated blood-pressure reading, without diagnosis of hypertension     Elevated blood pressure reading without diagnosis of hypertension    GERD (gastroesophageal reflux disease)     Hyperlipidemia     Other conditions influencing health status     Disorder Of The Pelvis / Hip Joint / Femur    Other headache syndrome     Chronic mixed headache syndrome    Other intervertebral disc degeneration, lumbar region     Disc degeneration, lumbar    Palmar fascial fibromatosis (dupuytren)     Dupuytren's contracture    Personal history of nicotine dependence     History of nicotine dependence    Personal history of other diseases of the musculoskeletal system and connective tissue     History of low back pain    Personal history of other endocrine, nutritional and metabolic disease     History of diabetes mellitus    Personal history of other endocrine, nutritional and metabolic disease     History of hyperlipidemia    Personal history of other specified conditions     History of headache    Scoliosis, unspecified     Scoliosis    Spondylosis without myelopathy or radiculopathy, lumbar region     Lumbar spondylosis    Synovitis and tenosynovitis, unspecified 08/26/2018    Tenosynovitis of finger, hand, or wrist    Unspecified condition associated with female genital organs and menstrual cycle     Genital symptoms, female        Past Surgical History:   Procedure Laterality Date    OTHER SURGICAL " HISTORY  01/18/2021    Complete colonoscopy    OTHER SURGICAL HISTORY  01/18/2021    Esophagogastroduodenoscopy    SPINE SURGERY  01/15/2014    Spine Repair    TOTAL HIP ARTHROPLASTY  12/07/2015    Hip Replacement        Family History   Problem Relation Name Age of Onset    Other (Benign polyps of the large intestine) Mother      Colon cancer Brother      Heart failure Other          Allergies   Allergen Reactions    Adhesive Tape-Silicones Unknown    Egg Unknown    Sulfa (Sulfonamide Antibiotics) Unknown     as a child, pt unaware of reaction        Objective     Vitals:    01/28/25 1333   BP: 150/68   Pulse: 90   Resp: 17        Physical Exam  General: NAD, well groomed, well nourished  Eyes: Non-icteric sclera, EOMI  Ears, Nose, Mouth, and Throat: External ears and nose appear to be without deformity or rash. No lesions or masses noted. Hearing is grossly intact.   Neck: Supple, trachea midline, no appreciable lumps or lymph nodes  Respiratory: Nonlabored breathing   Cardiovascular: No peripheral edema observed  Skin: No rashes or open lesions/ulcers identified on skin.  Psychiatric: Alert, orientation to person, place, and time. Cooperative.    Neurologic:   Cranial nerves grossly intact.   Strength: 5/5 and symmetric plantar/dorsiflexion   Sensation: Normal to light touch throughout; pinprick intact throughout.   DTRs:normal and symmetric throughout    Back:   Palpation: Tenderness to palpation over lumbar paraspinous muscles.   Straight leg raise: does not reproduce their pain, bilaterally   SUSAN Maneuver does reproduce pain on the right  Facet Loading test: absent on lumbar spine    Hip: No pain over greater trochanters. and Pain not reproduced with hip internal/external rotation.     SI Joint: Pain with SUSAN , Pain with Yeoman's , Pain with thigh thrust , Amaya finger sign present, and on the RIGHT      Imaging personally reviewed and independently interpreted:   No results found for this or any previous  visit from the past 1000 days.     No image results found.     1. Bilateral hip pain  XR hips bilateral 5+ VW w pelvis when performed           Assessment/Plan   Alida Drew is a 79 y.o. female with a past medical history of chronic low back pain, total hip replacements.  Patient states that her pain is mostly in the right lower back, radiating anteriorly into the right groin.  Pain is worse with prolonged standing, and sometimes with prolonged walking if done briskly.  She does not really have pain with sitting.  Gabapentin seems to help her pain.  Flexeril exam was significant for right SIJ tenderness, positive Melvin's/Gaenslen's/Yeomans on the right.      Plan:  - We will increase patient's scheduled gabapentin dose from 300 mg nightly to 300 mg twice daily.  - X-ray lumbar spine flexion-extension.  - In office right SI joint injection continue 4 mL of 0.5% bupivacaine injectate administered with verbal consent.  The patient has already failed conservative therapies including medications such as acetaminophen, NSAIDs, and gabapentinoids; as well as physical therapy. Therefore, we discussed extensively the risks, benefits, and alternatives to the procedure. The patient's questions were addressed and answered in detail. The patient demonstrated understanding of the procedure, and is amenable to proceeding with it. The Risks of the procedure that were discussed with the patient include but are not limited to the following: A lack of efficacy, transient worsening of pain, bleeding, infection, nerve injury, nerve damage, neuritis or sunburn sensation.    Procedures performed:   1.  Right sacroiliac joint injection under landmark guidance    Indication: right sacroilitis    Performed by: Dr. Rolle  Assistant: Tiff Mccabe MD     Informed consent was obtained and verified during a time-out procedure.  The patient was prepped and draped in the usual sterile fashion using a plastic drape and chloraprep.  A  25-gauge Quincke needle was advanced to the target area in. $ mL of 0.5% bupivacaine and was injected after repeated negative aspiration every 1 mL.  Hemostasis was ensured.  Band-Aids were applied.  All injectate's were noted to be preservative-free and not .    Post-procedure details:   Procedure completion:  Tolerated well, no immediate complications    Follow up: As needed     The patient was invited to contact us back anytime with any questions or concerns and follow-up with us in the office as needed.     Diagnoses and all orders for this visit:  Bilateral hip pain  -     XR hips bilateral 5+ VW w pelvis when performed; Future      This note was generated with the aid of dictation software, there may be typos despite my attempts at proofreading.     Tiff Mccabe MD  Interventional Pain Medicine Fellow  [facility]

## 2025-02-17 ENCOUNTER — TELEPHONE (OUTPATIENT)
Dept: PRIMARY CARE | Facility: CLINIC | Age: 80
End: 2025-02-17
Payer: MEDICARE

## 2025-02-27 DIAGNOSIS — M48.061 SPINAL STENOSIS OF LUMBAR REGION, UNSPECIFIED WHETHER NEUROGENIC CLAUDICATION PRESENT: ICD-10-CM

## 2025-03-01 RX ORDER — GABAPENTIN 300 MG/1
300 CAPSULE ORAL 2 TIMES DAILY
Qty: 60 CAPSULE | Refills: 0 | Status: SHIPPED | OUTPATIENT
Start: 2025-03-01

## 2025-04-11 DIAGNOSIS — R13.10 DYSPHAGIA: ICD-10-CM

## 2025-04-12 RX ORDER — ROSUVASTATIN CALCIUM 10 MG/1
TABLET, COATED ORAL
Qty: 90 TABLET | Refills: 1 | Status: SHIPPED | OUTPATIENT
Start: 2025-04-12

## 2025-04-12 RX ORDER — RABEPRAZOLE SODIUM 20 MG/1
20 TABLET, DELAYED RELEASE ORAL DAILY
Qty: 90 TABLET | Refills: 1 | Status: SHIPPED | OUTPATIENT
Start: 2025-04-12

## 2025-05-12 DIAGNOSIS — M48.061 SPINAL STENOSIS OF LUMBAR REGION, UNSPECIFIED WHETHER NEUROGENIC CLAUDICATION PRESENT: ICD-10-CM

## 2025-05-12 RX ORDER — GABAPENTIN 300 MG/1
300 CAPSULE ORAL 2 TIMES DAILY
Qty: 60 CAPSULE | Refills: 0 | Status: SHIPPED | OUTPATIENT
Start: 2025-05-12

## 2025-06-02 ENCOUNTER — APPOINTMENT (OUTPATIENT)
Dept: PRIMARY CARE | Facility: CLINIC | Age: 80
End: 2025-06-02
Payer: MEDICARE

## 2025-06-02 VITALS — WEIGHT: 144 LBS | BODY MASS INDEX: 27.21 KG/M2 | SYSTOLIC BLOOD PRESSURE: 123 MMHG | DIASTOLIC BLOOD PRESSURE: 74 MMHG

## 2025-06-02 DIAGNOSIS — Z00.00 HEALTH CARE MAINTENANCE: Primary | ICD-10-CM

## 2025-06-02 DIAGNOSIS — M48.061 SPINAL STENOSIS OF LUMBAR REGION, UNSPECIFIED WHETHER NEUROGENIC CLAUDICATION PRESENT: ICD-10-CM

## 2025-06-02 DIAGNOSIS — E78.2 MIXED HYPERLIPIDEMIA: ICD-10-CM

## 2025-06-02 DIAGNOSIS — E11.9 TYPE 2 DIABETES MELLITUS WITHOUT COMPLICATION, UNSPECIFIED WHETHER LONG TERM INSULIN USE: ICD-10-CM

## 2025-06-02 DIAGNOSIS — I10 PRIMARY HYPERTENSION: ICD-10-CM

## 2025-06-02 PROCEDURE — G2211 COMPLEX E/M VISIT ADD ON: HCPCS | Performed by: INTERNAL MEDICINE

## 2025-06-02 PROCEDURE — 3074F SYST BP LT 130 MM HG: CPT | Performed by: INTERNAL MEDICINE

## 2025-06-02 PROCEDURE — 99214 OFFICE O/P EST MOD 30 MIN: CPT | Performed by: INTERNAL MEDICINE

## 2025-06-02 PROCEDURE — 3078F DIAST BP <80 MM HG: CPT | Performed by: INTERNAL MEDICINE

## 2025-06-02 NOTE — PROGRESS NOTES
Subjective   Patient ID: Alida Drew is a 79 y.o. female who presents for No chief complaint on file..    HPI follow-up visit no chest pain no shortness of breath no side effect with medication otherwise has been doing okay no hypoglycemic episodes no falls has been following up with pain management takes up to 900 mg of the gabapentin on a regular basis up-to-date with her prescriptions will be going to a 4-month cruise next year and we will need to advise on her prescriptions at that time    Review of Systems    Objective   There were no vitals taken for this visit.    Physical Exam vs noted alert and oriented x 3 NCAT no JVD chest clear to auscultation cor regular rate and rhythm S1-S2 LS spine relatively straightened it is really the T-spine where she has most of her discomfort towards the left side extremities no clubbing cyanosis or edema normal distal pulses DTR 2+    Assessment/Plan        Impression spinal stenosis hypertension hyperlipidemia diabetes mellitus  Plan good diet regular exercise good water consumption check glycohemoglobin advised on long-term blood sugar test she does not check blood sugars at home check Chem-7 advised on glucose potassium and kidney function and advised on blood pressure blood pressure medicine check hepatic panel advised on liver enzymes check lipid panel advised on cholesterol cholesterol medicine follow-up on prescriptions and screening and then recheck 3 months based on above  [id number] cc21    finish note see prior

## 2025-06-03 LAB
ALBUMIN SERPL-MCNC: 4.7 G/DL (ref 3.6–5.1)
ALP SERPL-CCNC: 109 U/L (ref 37–153)
ALT SERPL-CCNC: 41 U/L (ref 6–29)
ANION GAP SERPL CALCULATED.4IONS-SCNC: 9 MMOL/L (CALC) (ref 7–17)
AST SERPL-CCNC: 45 U/L (ref 10–35)
BILIRUB SERPL-MCNC: 0.3 MG/DL (ref 0.2–1.2)
BUN SERPL-MCNC: 15 MG/DL (ref 7–25)
CALCIUM SERPL-MCNC: 9.4 MG/DL (ref 8.6–10.4)
CHLORIDE SERPL-SCNC: 103 MMOL/L (ref 98–110)
CHOLEST SERPL-MCNC: 160 MG/DL
CHOLEST/HDLC SERPL: 4 (CALC)
CO2 SERPL-SCNC: 27 MMOL/L (ref 20–32)
CREAT SERPL-MCNC: 1.04 MG/DL (ref 0.6–1)
EGFRCR SERPLBLD CKD-EPI 2021: 55 ML/MIN/1.73M2
EST. AVERAGE GLUCOSE BLD GHB EST-MCNC: 131 MG/DL
EST. AVERAGE GLUCOSE BLD GHB EST-SCNC: 7.3 MMOL/L
GLUCOSE SERPL-MCNC: 198 MG/DL (ref 65–99)
HBA1C MFR BLD: 6.2 %
HDLC SERPL-MCNC: 40 MG/DL
LDLC SERPL CALC-MCNC: 84 MG/DL (CALC)
NONHDLC SERPL-MCNC: 120 MG/DL (CALC)
POTASSIUM SERPL-SCNC: 5.1 MMOL/L (ref 3.5–5.3)
PROT SERPL-MCNC: 7.3 G/DL (ref 6.1–8.1)
SODIUM SERPL-SCNC: 139 MMOL/L (ref 135–146)
TRIGL SERPL-MCNC: 301 MG/DL

## 2025-06-10 ENCOUNTER — APPOINTMENT (OUTPATIENT)
Facility: CLINIC | Age: 80
End: 2025-06-10
Payer: MEDICARE

## 2025-06-10 VITALS
SYSTOLIC BLOOD PRESSURE: 118 MMHG | RESPIRATION RATE: 17 BRPM | HEART RATE: 64 BPM | HEIGHT: 61 IN | WEIGHT: 141 LBS | BODY MASS INDEX: 26.62 KG/M2 | DIASTOLIC BLOOD PRESSURE: 76 MMHG

## 2025-06-10 DIAGNOSIS — M25.551 BILATERAL HIP PAIN: Primary | ICD-10-CM

## 2025-06-10 DIAGNOSIS — M25.552 BILATERAL HIP PAIN: Primary | ICD-10-CM

## 2025-06-10 PROCEDURE — 1036F TOBACCO NON-USER: CPT | Performed by: PAIN MEDICINE

## 2025-06-10 PROCEDURE — 99214 OFFICE O/P EST MOD 30 MIN: CPT | Performed by: PAIN MEDICINE

## 2025-06-10 PROCEDURE — 1159F MED LIST DOCD IN RCRD: CPT | Performed by: PAIN MEDICINE

## 2025-06-10 PROCEDURE — 1125F AMNT PAIN NOTED PAIN PRSNT: CPT | Performed by: PAIN MEDICINE

## 2025-06-10 ASSESSMENT — PAIN DESCRIPTION - DESCRIPTORS: DESCRIPTORS: SORE;BURNING

## 2025-06-10 ASSESSMENT — PAIN - FUNCTIONAL ASSESSMENT: PAIN_FUNCTIONAL_ASSESSMENT: 0-10

## 2025-06-10 ASSESSMENT — PAIN SCALES - GENERAL
PAINLEVEL_OUTOF10: 2
PAINLEVEL_OUTOF10: 2

## 2025-07-07 DIAGNOSIS — Z00.00 ENCOUNTER FOR GENERAL ADULT MEDICAL EXAMINATION WITHOUT ABNORMAL FINDINGS: ICD-10-CM

## 2025-07-10 RX ORDER — ESCITALOPRAM OXALATE 10 MG/1
10 TABLET ORAL DAILY
Qty: 90 TABLET | Refills: 2 | Status: SHIPPED | OUTPATIENT
Start: 2025-07-10

## 2025-07-23 DIAGNOSIS — M48.061 SPINAL STENOSIS OF LUMBAR REGION, UNSPECIFIED WHETHER NEUROGENIC CLAUDICATION PRESENT: ICD-10-CM

## 2025-07-25 RX ORDER — GABAPENTIN 300 MG/1
300 CAPSULE ORAL 2 TIMES DAILY
Qty: 60 CAPSULE | Refills: 0 | Status: SHIPPED | OUTPATIENT
Start: 2025-07-25

## 2025-08-13 ENCOUNTER — OFFICE VISIT (OUTPATIENT)
Dept: URGENT CARE | Age: 80
End: 2025-08-13
Payer: MEDICARE

## 2025-08-13 VITALS
TEMPERATURE: 98.7 F | BODY MASS INDEX: 26.81 KG/M2 | HEART RATE: 65 BPM | SYSTOLIC BLOOD PRESSURE: 149 MMHG | RESPIRATION RATE: 18 BRPM | HEIGHT: 61 IN | DIASTOLIC BLOOD PRESSURE: 70 MMHG | OXYGEN SATURATION: 97 % | WEIGHT: 142 LBS

## 2025-08-13 DIAGNOSIS — R23.3 SPONTANEOUS ECCHYMOSIS: Primary | ICD-10-CM

## 2025-08-13 PROCEDURE — 99213 OFFICE O/P EST LOW 20 MIN: CPT | Performed by: FAMILY MEDICINE

## 2025-08-13 PROCEDURE — 1159F MED LIST DOCD IN RCRD: CPT | Performed by: FAMILY MEDICINE

## 2025-08-13 ASSESSMENT — ENCOUNTER SYMPTOMS
DEPRESSION: 0
OCCASIONAL FEELINGS OF UNSTEADINESS: 0
LOSS OF SENSATION IN FEET: 0

## 2025-08-13 ASSESSMENT — PATIENT HEALTH QUESTIONNAIRE - PHQ9
SUM OF ALL RESPONSES TO PHQ9 QUESTIONS 1 AND 2: 0
1. LITTLE INTEREST OR PLEASURE IN DOING THINGS: NOT AT ALL
2. FEELING DOWN, DEPRESSED OR HOPELESS: NOT AT ALL

## 2025-12-22 ENCOUNTER — APPOINTMENT (OUTPATIENT)
Dept: PRIMARY CARE | Facility: CLINIC | Age: 80
End: 2025-12-22
Payer: MEDICARE

## (undated) DEVICE — Device

## (undated) DEVICE — SILICONE VESSEL LOOPS

## (undated) DEVICE — BANDAGE, ESMARK 4 IN X 9 FT, STERILE

## (undated) DEVICE — PADDING, WEBRIL, UNDERCAST, STERILE, 4 IN

## (undated) DEVICE — SUTURE, VICRYL, 4-0, 18 IN, P-3, UNDYED

## (undated) DEVICE — DRESSING, GAUZE, PETROLATUM, STRIP, XEROFORM, 1 X 8 IN, STERILE

## (undated) DEVICE — PREP TRAY, SKIN, DRY, W/12 SPONGES, W/GLOVES

## (undated) DEVICE — DRAPE, TOWEL, STERI DRAPE, 17 X 11 IN, PLASTIC, STERILE

## (undated) DEVICE — STOCKINETTE, DOUBLE PLY, 4 X 48 IN, STERILE

## (undated) DEVICE — BANDAGE, ELASTIC, ACE, SELF-CLOSURE, 3 IN X 5 YD, NONSTERILE

## (undated) DEVICE — CUFF, TOURNIQUET, 18 X 4, SNGL PORT/SNGL BLADDER, DISP, LF

## (undated) DEVICE — SUTURE, MONOCRYLIC, 4-0, P3, MONO 18

## (undated) DEVICE — PACK, BASIC

## (undated) DEVICE — DRAPE, SHEET, HAND, GUSSETTED, W/TABLE EXTENSION, 77 X 146 IN, DISPOSABLE, LF, STERILE

## (undated) DEVICE — DRESSING, GAUZE, SPONGE, 12 PLY, CURITY, 4 X 4 IN, STERILE

## (undated) DEVICE — TOWEL, SURGICAL, NEURO, O/R, 16 X 26, BLUE, STERILE

## (undated) DEVICE — SPONGE, GAUZE, XRAY DECT, 16 PLY, 4 X 4, W/MASTER DMT,STERILE